# Patient Record
Sex: FEMALE | Race: WHITE | Employment: FULL TIME | URBAN - METROPOLITAN AREA
[De-identification: names, ages, dates, MRNs, and addresses within clinical notes are randomized per-mention and may not be internally consistent; named-entity substitution may affect disease eponyms.]

---

## 2017-09-17 PROBLEM — S82.401A FRACTURE OF RIGHT FIBULA: Status: ACTIVE | Noted: 2017-09-17

## 2017-09-17 PROBLEM — S82.201A FRACTURE OF RIGHT TIBIA: Status: ACTIVE | Noted: 2017-09-17

## 2019-03-15 ENCOUNTER — OFFICE VISIT (OUTPATIENT)
Dept: ORTHOPEDIC SURGERY | Age: 49
End: 2019-03-15
Payer: COMMERCIAL

## 2019-03-15 VITALS — WEIGHT: 124.9 LBS | HEIGHT: 64 IN | TEMPERATURE: 98 F | BODY MASS INDEX: 21.32 KG/M2

## 2019-03-15 DIAGNOSIS — M16.12 PRIMARY OSTEOARTHRITIS OF LEFT HIP: Primary | ICD-10-CM

## 2019-03-15 PROCEDURE — 99213 OFFICE O/P EST LOW 20 MIN: CPT | Performed by: ORTHOPAEDIC SURGERY

## 2019-04-26 RX ORDER — SODIUM CHLORIDE, SODIUM LACTATE, POTASSIUM CHLORIDE, CALCIUM CHLORIDE 600; 310; 30; 20 MG/100ML; MG/100ML; MG/100ML; MG/100ML
INJECTION, SOLUTION INTRAVENOUS CONTINUOUS
Status: CANCELLED | OUTPATIENT
Start: 2019-05-07

## 2019-04-29 ENCOUNTER — HOSPITAL ENCOUNTER (OUTPATIENT)
Dept: PREADMISSION TESTING | Age: 49
Discharge: HOME | End: 2019-04-29
Payer: COMMERCIAL

## 2019-04-29 ENCOUNTER — ANESTHESIA EVENT (OUTPATIENT)
Dept: OPERATING ROOM | Age: 49
DRG: 470 | End: 2019-04-29
Payer: COMMERCIAL

## 2019-04-29 VITALS
TEMPERATURE: 97.9 F | RESPIRATION RATE: 16 BRPM | OXYGEN SATURATION: 100 % | WEIGHT: 127 LBS | HEART RATE: 75 BPM | BODY MASS INDEX: 21.68 KG/M2 | SYSTOLIC BLOOD PRESSURE: 130 MMHG | HEIGHT: 64 IN | DIASTOLIC BLOOD PRESSURE: 76 MMHG

## 2019-04-29 DIAGNOSIS — M16.12 ARTHRITIS OF LEFT HIP: Primary | ICD-10-CM

## 2019-04-29 LAB
ABO/RH: NORMAL
ANION GAP SERPL CALCULATED.3IONS-SCNC: 10 MMOL/L (ref 7–16)
ANTIBODY SCREEN: NORMAL
APTT: 31.7 SEC (ref 24.5–35.1)
BUN BLDV-MCNC: 10 MG/DL (ref 6–20)
CALCIUM SERPL-MCNC: 9.5 MG/DL (ref 8.6–10.2)
CHLORIDE BLD-SCNC: 100 MMOL/L (ref 98–107)
CO2: 28 MMOL/L (ref 22–29)
CREAT SERPL-MCNC: 0.7 MG/DL (ref 0.5–1)
GFR AFRICAN AMERICAN: >60
GFR NON-AFRICAN AMERICAN: >60 ML/MIN/1.73
GLUCOSE BLD-MCNC: 96 MG/DL (ref 74–99)
HCT VFR BLD CALC: 42.5 % (ref 34–48)
HEMOGLOBIN: 14 G/DL (ref 11.5–15.5)
INR BLD: 1
MCH RBC QN AUTO: 31.7 PG (ref 26–35)
MCHC RBC AUTO-ENTMCNC: 32.9 % (ref 32–34.5)
MCV RBC AUTO: 96.2 FL (ref 80–99.9)
PDW BLD-RTO: 12.9 FL (ref 11.5–15)
PLATELET # BLD: 294 E9/L (ref 130–450)
PMV BLD AUTO: 10.5 FL (ref 7–12)
POTASSIUM REFLEX MAGNESIUM: 4 MMOL/L (ref 3.5–5)
PROTHROMBIN TIME: 11.4 SEC (ref 9.3–12.4)
RBC # BLD: 4.42 E12/L (ref 3.5–5.5)
SODIUM BLD-SCNC: 138 MMOL/L (ref 132–146)
WBC # BLD: 5.8 E9/L (ref 4.5–11.5)

## 2019-04-29 PROCEDURE — 85027 COMPLETE CBC AUTOMATED: CPT

## 2019-04-29 PROCEDURE — 85730 THROMBOPLASTIN TIME PARTIAL: CPT

## 2019-04-29 PROCEDURE — 86850 RBC ANTIBODY SCREEN: CPT

## 2019-04-29 PROCEDURE — 86900 BLOOD TYPING SEROLOGIC ABO: CPT

## 2019-04-29 PROCEDURE — 85610 PROTHROMBIN TIME: CPT

## 2019-04-29 PROCEDURE — 86901 BLOOD TYPING SEROLOGIC RH(D): CPT

## 2019-04-29 PROCEDURE — 36415 COLL VENOUS BLD VENIPUNCTURE: CPT

## 2019-04-29 PROCEDURE — 87081 CULTURE SCREEN ONLY: CPT

## 2019-04-29 PROCEDURE — 80048 BASIC METABOLIC PNL TOTAL CA: CPT

## 2019-04-29 RX ORDER — HYDROCORTISONE ACETATE 25 MG/1
SUPPOSITORY RECTAL
Refills: 1 | COMMUNITY
Start: 2019-02-08 | End: 2019-05-31

## 2019-04-29 ASSESSMENT — PAIN DESCRIPTION - ORIENTATION: ORIENTATION: LEFT

## 2019-04-29 ASSESSMENT — PAIN DESCRIPTION - LOCATION: LOCATION: HIP

## 2019-04-29 ASSESSMENT — PAIN - FUNCTIONAL ASSESSMENT: PAIN_FUNCTIONAL_ASSESSMENT: PREVENTS OR INTERFERES SOME ACTIVE ACTIVITIES AND ADLS

## 2019-04-29 ASSESSMENT — PAIN DESCRIPTION - ONSET: ONSET: ON-GOING

## 2019-04-29 ASSESSMENT — PAIN DESCRIPTION - FREQUENCY: FREQUENCY: CONTINUOUS

## 2019-04-29 ASSESSMENT — PAIN DESCRIPTION - PROGRESSION: CLINICAL_PROGRESSION: GRADUALLY WORSENING

## 2019-04-29 ASSESSMENT — PAIN DESCRIPTION - DESCRIPTORS: DESCRIPTORS: ACHING;SHARP

## 2019-04-29 ASSESSMENT — PAIN DESCRIPTION - PAIN TYPE: TYPE: CHRONIC PAIN

## 2019-04-29 ASSESSMENT — PAIN SCALES - GENERAL: PAINLEVEL_OUTOF10: 3

## 2019-04-29 NOTE — PROGRESS NOTES
Patient and spouse attended preoperative Total [address] on 4/29/2019. Patient is scheduled to have an elective hip replacement. Patient was educated regarding Disease Process, Medications, Smoking Cessation, Oxygenation, Incentive Spirometry and Deep Breath and Cough, signs and symptoms of postoperative joint infection that include: Fever, Chills, Pain Control, Drainage and Redness, post-op follow up with orthopaedic surgeon, dressing removal, staple removal, ambulatory devices which include a standard walker and cane, bed mobility, correct anatomical alignment, active range of motion, proper transferring technique, incision care, infection prevention measures, non-pharmacologic comfort measures, notification of inadequate pain control measures, pain scale for assessing level of pain, pharmacologic pain management, relaxation techniques. [id number] also included patient and family watching an educational total hip replacement video and visual examples of mobility training postoperative by the orthopaedic navigator.

## 2019-04-29 NOTE — PROGRESS NOTES
[address] [facility]                                                                                                                    PRE OP INSTRUCTIONS FOR  Winnie Brunner        Date: 4/29/2019    Date of surgery: 5/7/19   Arrival Time: Hospital will call you between 5pm and 7pm with your final arrival time for surgery    1. Do not eat or drink anything after midnight prior to surgery. This includes no water, chewing gum, mints or ice chips. 2. Take the following medications with a small sip of water on the morning of Surgery: none     3. Diabetics may take evening dose of insulin but none after midnight. If you feel symptomatic or low blood sugar morning of surgery drink 1-2 ounces of apple juice only. 4. Aspirin, Ibuprofen, Advil, Naproxen, Vitamin E and other Anti-inflammatory products should be stopped  before surgery  as directed by your physician. Take Tylenol only unless instructed otherwise by your surgeon. 5. Check with your Doctor regarding stopping Plavix, Coumadin, Lovenox, Eliquis, Effient, or other blood thinners. 6. Do not smoke,use illicit drugs and do not drink any alcoholic beverages 24 hours prior to surgery. 7. You may brush your teeth the morning of surgery. DO NOT SWALLOW WATER    8. You MUST make arrangements for a responsible adult to take you home after your surgery. You will not be allowed to leave alone or drive yourself home. It is strongly suggested someone stay with you the first 24 hrs. Your surgery will be cancelled if you do not have a ride home. 9. PEDIATRIC PATIENTS ONLY:  A parent/legal guardian must accompany a child scheduled for surgery and plan to stay at the hospital until the child is discharged. Please do not bring other children with you.     10. Please wear simple, loose fitting clothing to the hospital.  Ana Nataliya not bring valuables (money, credit cards, checkbooks, etc.) Do not wear any makeup (including no eye makeup) or nail

## 2019-04-30 LAB — MRSA CULTURE ONLY: NORMAL

## 2019-04-30 NOTE — FLOWSHEET NOTE
04/30/19 1443   Social/Functional History   Lives With Spouse   Type of [address] Two level; Able to Live on Main level with bedroom/bathroom   Home Access Stairs to enter without rails   Entrance Stairs - Number of Steps 2 steps into home    Receives Help From Family   Met with pt in PAT, pt having surgery for a total hip arthroplasty on 5/7 , explained sw role for transition of care and reviewed rehab options and providers for [facility] [place] insurance, pt relays no past HHC/DME needs,list offered, pt plans to return home with assistance from her  and receptive to [facility] with [facility], pt anticipates borrowing dme needed from family. Notified Moira 11 liaison for [facility] who will follow post-op for home care orders and anticipated medical discharge on 5/8. Pt will use hospital pharmacy.  Electronically signed by GALLITO Pollack on 4/30/2019 at 2:36 PM

## 2019-05-07 ENCOUNTER — HOSPITAL ENCOUNTER (INPATIENT)
Age: 49
LOS: 1 days | Discharge: HOME HEALTH | DRG: 470 | End: 2019-05-08
Attending: ORTHOPAEDIC SURGERY | Admitting: ORTHOPAEDIC SURGERY
Payer: COMMERCIAL

## 2019-05-07 ENCOUNTER — APPOINTMENT (OUTPATIENT)
Dept: GENERAL RADIOLOGY | Age: 49
DRG: 470 | End: 2019-05-07
Attending: ORTHOPAEDIC SURGERY
Payer: COMMERCIAL

## 2019-05-07 ENCOUNTER — ANESTHESIA (OUTPATIENT)
Dept: OPERATING ROOM | Age: 49
DRG: 470 | End: 2019-05-07
Payer: COMMERCIAL

## 2019-05-07 VITALS
RESPIRATION RATE: 16 BRPM | SYSTOLIC BLOOD PRESSURE: 102 MMHG | OXYGEN SATURATION: 89 % | DIASTOLIC BLOOD PRESSURE: 51 MMHG | TEMPERATURE: 93.2 F

## 2019-05-07 DIAGNOSIS — M16.12 ARTHRITIS OF LEFT HIP: Primary | ICD-10-CM

## 2019-05-07 DIAGNOSIS — M16.0 PRIMARY OSTEOARTHRITIS OF BOTH HIPS: ICD-10-CM

## 2019-05-07 PROBLEM — M16.9 DEGENERATIVE ARTHRITIS OF HIP: Status: ACTIVE | Noted: 2019-05-07

## 2019-05-07 LAB — HCG(URINE) PREGNANCY TEST: NEGATIVE

## 2019-05-07 PROCEDURE — 2500000003 HC RX 250 WO HCPCS: Performed by: ORTHOPAEDIC SURGERY

## 2019-05-07 PROCEDURE — 7100000000 HC PACU RECOVERY - FIRST 15 MIN: Performed by: ORTHOPAEDIC SURGERY

## 2019-05-07 PROCEDURE — 6370000000 HC RX 637 (ALT 250 FOR IP): Performed by: STUDENT IN AN ORGANIZED HEALTH CARE EDUCATION/TRAINING PROGRAM

## 2019-05-07 PROCEDURE — 6360000002 HC RX W HCPCS: Performed by: NURSE ANESTHETIST, CERTIFIED REGISTERED

## 2019-05-07 PROCEDURE — 3209999900 FLUORO FOR SURGICAL PROCEDURES

## 2019-05-07 PROCEDURE — 6360000002 HC RX W HCPCS: Performed by: ORTHOPAEDIC SURGERY

## 2019-05-07 PROCEDURE — 97165 OT EVAL LOW COMPLEX 30 MIN: CPT

## 2019-05-07 PROCEDURE — 0SRB02Z REPLACEMENT OF LEFT HIP JOINT WITH METAL ON POLYETHYLENE SYNTHETIC SUBSTITUTE, OPEN APPROACH: ICD-10-PCS | Performed by: ORTHOPAEDIC SURGERY

## 2019-05-07 PROCEDURE — 88304 TISSUE EXAM BY PATHOLOGIST: CPT

## 2019-05-07 PROCEDURE — 3700000001 HC ADD 15 MINUTES (ANESTHESIA): Performed by: ORTHOPAEDIC SURGERY

## 2019-05-07 PROCEDURE — C1776 JOINT DEVICE (IMPLANTABLE): HCPCS | Performed by: ORTHOPAEDIC SURGERY

## 2019-05-07 PROCEDURE — 81025 URINE PREGNANCY TEST: CPT

## 2019-05-07 PROCEDURE — 88311 DECALCIFY TISSUE: CPT

## 2019-05-07 PROCEDURE — 2580000003 HC RX 258: Performed by: NURSE ANESTHETIST, CERTIFIED REGISTERED

## 2019-05-07 PROCEDURE — 6370000000 HC RX 637 (ALT 250 FOR IP): Performed by: ANESTHESIOLOGY

## 2019-05-07 PROCEDURE — 1200000000 HC SEMI PRIVATE

## 2019-05-07 PROCEDURE — 73502 X-RAY EXAM HIP UNI 2-3 VIEWS: CPT

## 2019-05-07 PROCEDURE — 2709999900 HC NON-CHARGEABLE SUPPLY: Performed by: ORTHOPAEDIC SURGERY

## 2019-05-07 PROCEDURE — 2580000003 HC RX 258: Performed by: ANESTHESIOLOGY

## 2019-05-07 PROCEDURE — 2500000003 HC RX 250 WO HCPCS: Performed by: NURSE ANESTHETIST, CERTIFIED REGISTERED

## 2019-05-07 PROCEDURE — 6370000000 HC RX 637 (ALT 250 FOR IP): Performed by: ORTHOPAEDIC SURGERY

## 2019-05-07 PROCEDURE — 3700000000 HC ANESTHESIA ATTENDED CARE: Performed by: ORTHOPAEDIC SURGERY

## 2019-05-07 PROCEDURE — 3600000005 HC SURGERY LEVEL 5 BASE: Performed by: ORTHOPAEDIC SURGERY

## 2019-05-07 PROCEDURE — 97161 PT EVAL LOW COMPLEX 20 MIN: CPT | Performed by: PHYSICAL THERAPIST

## 2019-05-07 PROCEDURE — 3600000015 HC SURGERY LEVEL 5 ADDTL 15MIN: Performed by: ORTHOPAEDIC SURGERY

## 2019-05-07 PROCEDURE — 2580000003 HC RX 258: Performed by: ORTHOPAEDIC SURGERY

## 2019-05-07 PROCEDURE — 7100000001 HC PACU RECOVERY - ADDTL 15 MIN: Performed by: ORTHOPAEDIC SURGERY

## 2019-05-07 PROCEDURE — 97530 THERAPEUTIC ACTIVITIES: CPT

## 2019-05-07 PROCEDURE — 97530 THERAPEUTIC ACTIVITIES: CPT | Performed by: PHYSICAL THERAPIST

## 2019-05-07 DEVICE — LINER ACET OD50MM ID32MM NEUT OFFSET HIP ALTRX PINN: Type: IMPLANTABLE DEVICE | Status: FUNCTIONAL

## 2019-05-07 DEVICE — HEAD FEM DIA32MM +5MM OFFSET 12/14 TAPR HIP CERAMIC BIOLOX: Type: IMPLANTABLE DEVICE | Status: FUNCTIONAL

## 2019-05-07 DEVICE — COMPONENT TOT HIP CAPPED ADV CEM POROUS: Type: IMPLANTABLE DEVICE | Status: FUNCTIONAL

## 2019-05-07 DEVICE — ELIMINATOR H APEX FOR 48-60MM PINN HIP SHELL: Type: IMPLANTABLE DEVICE | Status: FUNCTIONAL

## 2019-05-07 DEVICE — IMPLANTABLE DEVICE: Type: IMPLANTABLE DEVICE | Status: FUNCTIONAL

## 2019-05-07 DEVICE — STEM FEM SZ 12 L130MM NK L38.5MM 135DEG 41MM OFFSET STD HIP: Type: IMPLANTABLE DEVICE | Status: FUNCTIONAL

## 2019-05-07 RX ORDER — CEFAZOLIN SODIUM 2 G/50ML
SOLUTION INTRAVENOUS
Status: DISPENSED
Start: 2019-05-07 | End: 2019-05-07

## 2019-05-07 RX ORDER — HYDROCODONE BITARTRATE AND ACETAMINOPHEN 5; 325 MG/1; MG/1
2 TABLET ORAL EVERY 4 HOURS PRN
Status: DISCONTINUED | OUTPATIENT
Start: 2019-05-07 | End: 2019-05-08 | Stop reason: HOSPADM

## 2019-05-07 RX ORDER — GLYCOPYRROLATE 1 MG/5 ML
SYRINGE (ML) INTRAVENOUS PRN
Status: DISCONTINUED | OUTPATIENT
Start: 2019-05-07 | End: 2019-05-07 | Stop reason: SDUPTHER

## 2019-05-07 RX ORDER — PROPOFOL 10 MG/ML
INJECTION, EMULSION INTRAVENOUS PRN
Status: DISCONTINUED | OUTPATIENT
Start: 2019-05-07 | End: 2019-05-07 | Stop reason: SDUPTHER

## 2019-05-07 RX ORDER — PROPOFOL 10 MG/ML
INJECTION, EMULSION INTRAVENOUS CONTINUOUS PRN
Status: DISCONTINUED | OUTPATIENT
Start: 2019-05-07 | End: 2019-05-07 | Stop reason: SDUPTHER

## 2019-05-07 RX ORDER — HYDROCODONE BITARTRATE AND ACETAMINOPHEN 5; 325 MG/1; MG/1
1 TABLET ORAL EVERY 4 HOURS PRN
Status: DISCONTINUED | OUTPATIENT
Start: 2019-05-07 | End: 2019-05-08 | Stop reason: HOSPADM

## 2019-05-07 RX ORDER — DEXTROSE AND SODIUM CHLORIDE 5; .45 G/100ML; G/100ML
INJECTION, SOLUTION INTRAVENOUS CONTINUOUS
Status: DISCONTINUED | OUTPATIENT
Start: 2019-05-07 | End: 2019-05-08

## 2019-05-07 RX ORDER — SODIUM CHLORIDE 0.9 % (FLUSH) 0.9 %
10 SYRINGE (ML) INJECTION PRN
Status: DISCONTINUED | OUTPATIENT
Start: 2019-05-07 | End: 2019-05-07 | Stop reason: HOSPADM

## 2019-05-07 RX ORDER — ONDANSETRON 2 MG/ML
4 INJECTION INTRAMUSCULAR; INTRAVENOUS EVERY 6 HOURS PRN
Status: DISCONTINUED | OUTPATIENT
Start: 2019-05-07 | End: 2019-05-08 | Stop reason: HOSPADM

## 2019-05-07 RX ORDER — IBUPROFEN 400 MG/1
400 TABLET ORAL EVERY 8 HOURS PRN
Status: DISCONTINUED | OUTPATIENT
Start: 2019-05-07 | End: 2019-05-08 | Stop reason: HOSPADM

## 2019-05-07 RX ORDER — CELECOXIB 100 MG/1
200 CAPSULE ORAL ONCE
Status: DISCONTINUED | OUTPATIENT
Start: 2019-05-07 | End: 2019-05-07

## 2019-05-07 RX ORDER — ACETAMINOPHEN 500 MG
1000 TABLET ORAL ONCE
Status: COMPLETED | OUTPATIENT
Start: 2019-05-07 | End: 2019-05-07

## 2019-05-07 RX ORDER — SODIUM CHLORIDE, SODIUM LACTATE, POTASSIUM CHLORIDE, CALCIUM CHLORIDE 600; 310; 30; 20 MG/100ML; MG/100ML; MG/100ML; MG/100ML
INJECTION, SOLUTION INTRAVENOUS CONTINUOUS
Status: DISCONTINUED | OUTPATIENT
Start: 2019-05-07 | End: 2019-05-07

## 2019-05-07 RX ORDER — GABAPENTIN 300 MG/1
600 CAPSULE ORAL ONCE
Status: COMPLETED | OUTPATIENT
Start: 2019-05-07 | End: 2019-05-07

## 2019-05-07 RX ORDER — MORPHINE SULFATE 4 MG/ML
4 INJECTION, SOLUTION INTRAMUSCULAR; INTRAVENOUS
Status: DISCONTINUED | OUTPATIENT
Start: 2019-05-07 | End: 2019-05-08 | Stop reason: HOSPADM

## 2019-05-07 RX ORDER — BUPIVACAINE HYDROCHLORIDE 7.5 MG/ML
INJECTION, SOLUTION INTRASPINAL PRN
Status: DISCONTINUED | OUTPATIENT
Start: 2019-05-07 | End: 2019-05-07 | Stop reason: SDUPTHER

## 2019-05-07 RX ORDER — MIDAZOLAM HYDROCHLORIDE 1 MG/ML
INJECTION INTRAMUSCULAR; INTRAVENOUS PRN
Status: DISCONTINUED | OUTPATIENT
Start: 2019-05-07 | End: 2019-05-07 | Stop reason: SDUPTHER

## 2019-05-07 RX ORDER — DOCUSATE SODIUM 100 MG/1
100 CAPSULE, LIQUID FILLED ORAL 2 TIMES DAILY
Status: DISCONTINUED | OUTPATIENT
Start: 2019-05-07 | End: 2019-05-08 | Stop reason: HOSPADM

## 2019-05-07 RX ORDER — FENTANYL CITRATE 50 UG/ML
INJECTION, SOLUTION INTRAMUSCULAR; INTRAVENOUS PRN
Status: DISCONTINUED | OUTPATIENT
Start: 2019-05-07 | End: 2019-05-07 | Stop reason: SDUPTHER

## 2019-05-07 RX ORDER — SODIUM CHLORIDE 0.9 % (FLUSH) 0.9 %
10 SYRINGE (ML) INJECTION EVERY 12 HOURS SCHEDULED
Status: DISCONTINUED | OUTPATIENT
Start: 2019-05-07 | End: 2019-05-07 | Stop reason: HOSPADM

## 2019-05-07 RX ORDER — OXYCODONE HCL 10 MG/1
10 TABLET, FILM COATED, EXTENDED RELEASE ORAL ONCE
Status: COMPLETED | OUTPATIENT
Start: 2019-05-07 | End: 2019-05-07

## 2019-05-07 RX ORDER — SODIUM CHLORIDE 0.9 % (FLUSH) 0.9 %
10 SYRINGE (ML) INJECTION EVERY 12 HOURS SCHEDULED
Status: DISCONTINUED | OUTPATIENT
Start: 2019-05-07 | End: 2019-05-08 | Stop reason: HOSPADM

## 2019-05-07 RX ORDER — CEFAZOLIN SODIUM 2 G/50ML
2 SOLUTION INTRAVENOUS
Status: COMPLETED | OUTPATIENT
Start: 2019-05-07 | End: 2019-05-07

## 2019-05-07 RX ORDER — HYDROMORPHONE HYDROCHLORIDE 1 MG/ML
0.5 INJECTION, SOLUTION INTRAMUSCULAR; INTRAVENOUS; SUBCUTANEOUS EVERY 5 MIN PRN
Status: DISCONTINUED | OUTPATIENT
Start: 2019-05-07 | End: 2019-05-07 | Stop reason: HOSPADM

## 2019-05-07 RX ORDER — SODIUM CHLORIDE 0.9 % (FLUSH) 0.9 %
10 SYRINGE (ML) INJECTION PRN
Status: DISCONTINUED | OUTPATIENT
Start: 2019-05-07 | End: 2019-05-08 | Stop reason: HOSPADM

## 2019-05-07 RX ORDER — CEFAZOLIN SODIUM 2 G/50ML
2 SOLUTION INTRAVENOUS EVERY 8 HOURS
Status: COMPLETED | OUTPATIENT
Start: 2019-05-07 | End: 2019-05-07

## 2019-05-07 RX ORDER — ACETAMINOPHEN 325 MG/1
650 TABLET ORAL EVERY 4 HOURS PRN
Status: DISCONTINUED | OUTPATIENT
Start: 2019-05-07 | End: 2019-05-08 | Stop reason: HOSPADM

## 2019-05-07 RX ORDER — MEPERIDINE HYDROCHLORIDE 50 MG/ML
12.5 INJECTION INTRAMUSCULAR; INTRAVENOUS; SUBCUTANEOUS EVERY 5 MIN PRN
Status: DISCONTINUED | OUTPATIENT
Start: 2019-05-07 | End: 2019-05-07 | Stop reason: HOSPADM

## 2019-05-07 RX ORDER — MORPHINE SULFATE 2 MG/ML
2 INJECTION, SOLUTION INTRAMUSCULAR; INTRAVENOUS
Status: DISCONTINUED | OUTPATIENT
Start: 2019-05-07 | End: 2019-05-08 | Stop reason: HOSPADM

## 2019-05-07 RX ORDER — SODIUM CHLORIDE, SODIUM LACTATE, POTASSIUM CHLORIDE, CALCIUM CHLORIDE 600; 310; 30; 20 MG/100ML; MG/100ML; MG/100ML; MG/100ML
INJECTION, SOLUTION INTRAVENOUS CONTINUOUS PRN
Status: DISCONTINUED | OUTPATIENT
Start: 2019-05-07 | End: 2019-05-07 | Stop reason: SDUPTHER

## 2019-05-07 RX ORDER — EPHEDRINE SULFATE/0.9% NACL/PF 50 MG/5 ML
SYRINGE (ML) INTRAVENOUS PRN
Status: DISCONTINUED | OUTPATIENT
Start: 2019-05-07 | End: 2019-05-07 | Stop reason: SDUPTHER

## 2019-05-07 RX ADMIN — ACETAMINOPHEN 650 MG: 325 TABLET, FILM COATED ORAL at 21:12

## 2019-05-07 RX ADMIN — CEFAZOLIN SODIUM 2 G: 2 SOLUTION INTRAVENOUS at 11:44

## 2019-05-07 RX ADMIN — CEFAZOLIN SODIUM 2 G: 2 SOLUTION INTRAVENOUS at 08:07

## 2019-05-07 RX ADMIN — FENTANYL CITRATE 25 MCG: 50 INJECTION, SOLUTION INTRAMUSCULAR; INTRAVENOUS at 08:55

## 2019-05-07 RX ADMIN — SODIUM CHLORIDE, POTASSIUM CHLORIDE, SODIUM LACTATE AND CALCIUM CHLORIDE: 600; 310; 30; 20 INJECTION, SOLUTION INTRAVENOUS at 06:09

## 2019-05-07 RX ADMIN — GABAPENTIN 600 MG: 300 CAPSULE ORAL at 06:12

## 2019-05-07 RX ADMIN — FENTANYL CITRATE 50 MCG: 50 INJECTION, SOLUTION INTRAMUSCULAR; INTRAVENOUS at 10:51

## 2019-05-07 RX ADMIN — PHENYLEPHRINE HYDROCHLORIDE 100 MCG: 10 INJECTION INTRAVENOUS at 08:25

## 2019-05-07 RX ADMIN — PHENYLEPHRINE HYDROCHLORIDE 100 MCG: 10 INJECTION INTRAVENOUS at 10:04

## 2019-05-07 RX ADMIN — PHENYLEPHRINE HYDROCHLORIDE 100 MCG: 10 INJECTION INTRAVENOUS at 08:52

## 2019-05-07 RX ADMIN — BUPIVACAINE HYDROCHLORIDE IN DEXTROSE 2 ML: 7.5 INJECTION, SOLUTION SUBARACHNOID at 07:54

## 2019-05-07 RX ADMIN — SODIUM CHLORIDE, POTASSIUM CHLORIDE, SODIUM LACTATE AND CALCIUM CHLORIDE: 600; 310; 30; 20 INJECTION, SOLUTION INTRAVENOUS at 08:15

## 2019-05-07 RX ADMIN — PROPOFOL 35 MCG/KG/MIN: 10 INJECTION, EMULSION INTRAVENOUS at 08:07

## 2019-05-07 RX ADMIN — ONDANSETRON 4 MG: 2 INJECTION INTRAMUSCULAR; INTRAVENOUS at 13:28

## 2019-05-07 RX ADMIN — PHENYLEPHRINE HYDROCHLORIDE 100 MCG: 10 INJECTION INTRAVENOUS at 08:31

## 2019-05-07 RX ADMIN — MIDAZOLAM 2 MG: 1 INJECTION INTRAMUSCULAR; INTRAVENOUS at 07:43

## 2019-05-07 RX ADMIN — Medication 10 MG: at 09:27

## 2019-05-07 RX ADMIN — DOCUSATE SODIUM 100 MG: 100 CAPSULE, LIQUID FILLED ORAL at 21:12

## 2019-05-07 RX ADMIN — MIDAZOLAM 2 MG: 1 INJECTION INTRAMUSCULAR; INTRAVENOUS at 08:57

## 2019-05-07 RX ADMIN — FENTANYL CITRATE 25 MCG: 50 INJECTION, SOLUTION INTRAMUSCULAR; INTRAVENOUS at 07:54

## 2019-05-07 RX ADMIN — PHENYLEPHRINE HYDROCHLORIDE 100 MCG: 10 INJECTION INTRAVENOUS at 09:06

## 2019-05-07 RX ADMIN — ACETAMINOPHEN 1000 MG: 500 TABLET, FILM COATED ORAL at 06:12

## 2019-05-07 RX ADMIN — OXYCODONE HYDROCHLORIDE 10 MG: 10 TABLET, FILM COATED, EXTENDED RELEASE ORAL at 06:14

## 2019-05-07 RX ADMIN — ACETAMINOPHEN 650 MG: 325 TABLET, FILM COATED ORAL at 14:55

## 2019-05-07 RX ADMIN — SODIUM CHLORIDE, POTASSIUM CHLORIDE, SODIUM LACTATE AND CALCIUM CHLORIDE: 600; 310; 30; 20 INJECTION, SOLUTION INTRAVENOUS at 10:03

## 2019-05-07 RX ADMIN — PHENYLEPHRINE HYDROCHLORIDE 200 MCG: 10 INJECTION INTRAVENOUS at 08:13

## 2019-05-07 RX ADMIN — PROPOFOL 50 MG: 10 INJECTION, EMULSION INTRAVENOUS at 07:56

## 2019-05-07 RX ADMIN — TRANEXAMIC ACID 1 G: 1 INJECTION, SOLUTION INTRAVENOUS at 08:15

## 2019-05-07 RX ADMIN — FENTANYL CITRATE 50 MCG: 50 INJECTION, SOLUTION INTRAMUSCULAR; INTRAVENOUS at 09:44

## 2019-05-07 RX ADMIN — DEXTROSE AND SODIUM CHLORIDE: 5; 450 INJECTION, SOLUTION INTRAVENOUS at 19:22

## 2019-05-07 RX ADMIN — Medication 0.2 MG: at 08:29

## 2019-05-07 RX ADMIN — PHENYLEPHRINE HYDROCHLORIDE 100 MCG: 10 INJECTION INTRAVENOUS at 09:16

## 2019-05-07 RX ADMIN — PROPOFOL 30 MG: 10 INJECTION, EMULSION INTRAVENOUS at 08:41

## 2019-05-07 RX ADMIN — SODIUM CHLORIDE, POTASSIUM CHLORIDE, SODIUM LACTATE AND CALCIUM CHLORIDE: 600; 310; 30; 20 INJECTION, SOLUTION INTRAVENOUS at 07:47

## 2019-05-07 RX ADMIN — Medication 20 MG: at 08:38

## 2019-05-07 RX ADMIN — FENTANYL CITRATE 50 MCG: 50 INJECTION, SOLUTION INTRAMUSCULAR; INTRAVENOUS at 07:50

## 2019-05-07 RX ADMIN — IBUPROFEN 400 MG: 400 TABLET ORAL at 19:21

## 2019-05-07 RX ADMIN — CEFAZOLIN SODIUM 2 G: 2 SOLUTION INTRAVENOUS at 19:24

## 2019-05-07 ASSESSMENT — PULMONARY FUNCTION TESTS
PIF_VALUE: 1
PIF_VALUE: 0
PIF_VALUE: 1
PIF_VALUE: 0
PIF_VALUE: 1
PIF_VALUE: 0
PIF_VALUE: 1
PIF_VALUE: 0
PIF_VALUE: 1
PIF_VALUE: 0
PIF_VALUE: 1
PIF_VALUE: 0
PIF_VALUE: 1
PIF_VALUE: 0
PIF_VALUE: 1
PIF_VALUE: 0
PIF_VALUE: 1
PIF_VALUE: 0
PIF_VALUE: 1
PIF_VALUE: 0
PIF_VALUE: 1
PIF_VALUE: 0
PIF_VALUE: 1
PIF_VALUE: 0
PIF_VALUE: 1
PIF_VALUE: 0
PIF_VALUE: 1
PIF_VALUE: 0
PIF_VALUE: 0
PIF_VALUE: 1
PIF_VALUE: 0
PIF_VALUE: 1
PIF_VALUE: 0
PIF_VALUE: 1

## 2019-05-07 ASSESSMENT — PAIN - FUNCTIONAL ASSESSMENT
PAIN_FUNCTIONAL_ASSESSMENT: 0-10
PAIN_FUNCTIONAL_ASSESSMENT: ACTIVITIES ARE NOT PREVENTED

## 2019-05-07 ASSESSMENT — PAIN DESCRIPTION - DESCRIPTORS
DESCRIPTORS: ACHING;DISCOMFORT;SORE
DESCRIPTORS: DISCOMFORT;DULL;ACHING

## 2019-05-07 ASSESSMENT — PAIN DESCRIPTION - ORIENTATION
ORIENTATION: LEFT;ANTERIOR
ORIENTATION: LEFT

## 2019-05-07 ASSESSMENT — PAIN DESCRIPTION - FREQUENCY: FREQUENCY: INTERMITTENT

## 2019-05-07 ASSESSMENT — PAIN SCALES - GENERAL
PAINLEVEL_OUTOF10: 2
PAINLEVEL_OUTOF10: 5
PAINLEVEL_OUTOF10: 8
PAINLEVEL_OUTOF10: 0
PAINLEVEL_OUTOF10: 0
PAINLEVEL_OUTOF10: 8
PAINLEVEL_OUTOF10: 2
PAINLEVEL_OUTOF10: 4
PAINLEVEL_OUTOF10: 4
PAINLEVEL_OUTOF10: 3

## 2019-05-07 ASSESSMENT — PAIN DESCRIPTION - PAIN TYPE
TYPE: SURGICAL PAIN

## 2019-05-07 ASSESSMENT — PAIN DESCRIPTION - LOCATION
LOCATION: HIP

## 2019-05-07 NOTE — ANESTHESIA PRE PROCEDURE
Department of Anesthesiology  Preprocedure Note       Name:  Nia Calix   Age:  50 y.o.  :  1970                                          MRN:  [id number]         Date:  2019      Surgeon: Nakul Tariq):  FAYE Nayak DO    Procedure: LEFT HIP TOTAL ARTHROPLASTY WITH CELL SAVER (DEPUY) (Left )    Medications prior to admission:   Prior to Admission medications    Medication Sig Start Date End Date Taking? Authorizing Provider   hydrocortisone (ANUSOL-HC) 25 MG suppository UNWRAP AND INSERT ONE SUPPOSITORY RECTALLY EVERY 12 HOURS 19  Yes Historical Provider, MD   Omega-3 Fatty Acids (FISH OIL PO) Take by mouth daily    Historical Provider, MD   ibuprofen (IBU) 600 MG tablet Take 1 tablet by mouth every 6 hours as needed for Pain for 90 doses. Patient taking differently: Take 400 mg by mouth as needed for Pain  11/15/12   Kiley Chowdary DO       Current medications:    Current Facility-Administered Medications   Medication Dose Route Frequency Provider Last Rate Last Dose    sodium chloride flush 0.9 % injection 10 mL  10 mL Intravenous 2 times per day FAYE Nayak DO        sodium chloride flush 0.9 % injection 10 mL  10 mL Intravenous PRN FAYE Nayak,         ceFAZolin (ANCEF) 2 g in dextrose 3 % 50 mL IVPB (duplex)  2 g Intravenous On Call to [address], DO        tranexamic acid (CYKLOKAPRON) 1,000 mg in dextrose 5 % 100 mL IVPB  1,000 mg Intravenous On Call to [address], DO        lactated ringers infusion   Intravenous Continuous Beth Luna MD 10 mL/hr at 19 0609         Allergies:     Allergies   Allergen Reactions    Naproxen Other (See Comments)     Rectal bleeding, fever    Sulfa Antibiotics Hives and Itching    Codeine Nausea And Vomiting    Vicodin [Hydrocodone-Acetaminophen] Nausea And Vomiting       Problem List:    Patient Active Problem List   Diagnosis Code    Shoulder pain M25.519    Shoulder contusion S40.019A    Value Date    WBC 5.8 04/29/2019    RBC 4.42 04/29/2019    HGB 14.0 04/29/2019    HCT 42.5 04/29/2019    MCV 96.2 04/29/2019    RDW 12.9 04/29/2019     04/29/2019       CMP:   Lab Results   Component Value Date     04/29/2019    K 4.0 04/29/2019     04/29/2019    CO2 28 04/29/2019    BUN 10 04/29/2019    CREATININE 0.7 04/29/2019    GFRAA >60 04/29/2019    LABGLOM >60 04/29/2019    GLUCOSE 96 04/29/2019    PROT 6.3 09/17/2017    CALCIUM 9.5 04/29/2019    BILITOT 0.3 09/17/2017    ALKPHOS 62 09/17/2017    AST 21 09/17/2017    ALT 12 09/17/2017       POC Tests: No results for input(s): POCGLU, POCNA, POCK, POCCL, POCBUN, POCHEMO, POCHCT in the last 72 hours. Coags:   Lab Results   Component Value Date    PROTIME 11.4 04/29/2019    INR 1.0 04/29/2019    APTT 31.7 04/29/2019       HCG (If Applicable):   Lab Results   Component Value Date    PREGTESTUR NEGATIVE 05/07/2019        ABGs: No results found for: PHART, PO2ART, XKI0ICS, BWX1ZEK, BEART, D0EITVQJ     Type & Screen (If Applicable):  No results found for: [facility]    Anesthesia Evaluation  Patient summary reviewed   history of anesthetic complications: PONV. Airway: Mallampati: II  TM distance: >3 FB   Neck ROM: full  Mouth opening: > = 3 FB Dental:          Pulmonary:normal exam  breath sounds clear to auscultation                             Cardiovascular:Negative CV ROS          ECG reviewed  Rhythm: regular  Rate: normal                 ROS comment: EKG: Normal Sinus Rhythm 100. Neuro/Psych:                ROS comment: H/O Fracture Tibia and Fibula. GI/Hepatic/Renal: Neg GI/Hepatic/Renal ROS           ROS comment: Rectal Fissure. .   Endo/Other:    (+) : arthritis:., .                  ROS comment: H/O Nosebleed. Abdominal:           Vascular:           ROS comment: Varicose veins Left LL. Longoria Challenger Anesthesia Plan      spinal     ASA 3     (Pt/INR/aPTT = 11.4/1.0/30.7  )  Induction: intravenous.   BIS  MIPS:

## 2019-05-07 NOTE — PROGRESS NOTES
Room #:   8382/5661-26  Patient Name: Westley Jung    PHYSICAL THERAPY INITIAL EVALUATION    Tentative placement recommendation: HHPT 5 days/week    Equipment recommendation: Casey Wesley and Tub transfer bench      Plan of care: Patient will be seen   twice daily;  for therapeutic exercise, functional retraining, endurance activities, balance exercises, family and patient education. AM-PAC Basic Mobility        AM-PAC Mobility Inpatient   How much difficulty turning over in bed?: A Lot  How much difficulty sitting down on / standing up from a chair with arms?: A Lot  How much difficulty moving from lying on back to sitting on side of bed?: A Lot  How much help from another person moving to and from a bed to a chair?: A Lot  How much help from another person needed to walk in hospital room?: A Lot  How much help from another person for climbing 3-5 steps with a railing?: A Lot  AM-PAC Inpatient Mobility Raw Score : 12  AM-PAC Inpatient T-Scale Score : 35.33  Mobility Inpatient CMS 0-100% Score: 68.66  Mobility Inpatient CMS G-Code Modifier : CL    Order:  EVALUATE AND TREAT    Diagnosis/Problem list: s/p RANDEE anterior approach PWB  1.  Arthritis of left hip        Patient Active Problem List   Diagnosis    Shoulder pain    Shoulder contusion    Shoulder sprain    Arthritis of left hip    Trauma    Fracture of right tibia    Fracture of right fibula    Degenerative arthritis of hip       Past medical history:       Diagnosis Date    Arthritis     left hip osteo arthritis    Fractures     right tibia fibula    Nosebleed 2011    history of bad bleed; one time only    PONV (postoperative nausea and vomiting)     sodium pentathol    PVC (premature ventricular contraction)     history of     Rectal fissure     Varicose veins of left lower extremity    ;      Procedure Laterality Date    CYST REMOVAL  2007    necrotized tissue; encapsulated; removed groin    FRACTURE SURGERY  10/2017 tibia, fibula right leg    KNEE SURGERY  1983    lateral release    OTHER SURGICAL HISTORY Right 09/26/2017    ORIF right lower tibia, fibula    WISDOM TOOTH EXTRACTION         The admitting diagnosis and active problem list as listed above have been reviewed prior to the initiation of this evaluation. 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 L hip    FUNCTIONAL ASSESSMENT   Bed Mobility- Supine to sit- Moderate assist          Scooting- Minimal assist       Sit to supine- Minimal assist      Transfers-Sit to stand- Minimal assist x 2     Gait:  Patient ambulated 3 side steps to head of bed  feet using wheeled walker with Minimal assist x 2     Steps:  Not assessed      Balance: sit-good         stand fair       Edema: yes - lle  Endurance: fair       Treatment:  Therapist educated and facilitated patient on techniques to increase safety and independence with bed mobility, balance, functional transfers, and functional mobility. Patient performed ankle pumps, quad sets, glut sets x 15-20 each. Patient instruced in hip precautions and PWB LLE. Sat EOB x 15 minutes to increase dynamic sitting balance and activity tolerance. Patient stood at bedside, with side steps to head of bed. Patient unable to ambulated further due to dizziness and nausea. Returned to bed. Patient demonstrating fair   understanding of education/techniques, requiring additional training/education.   At end of session, patient in bed with alarm, call light and phone within reach,   all lines and tubes intact, nursing notified. Pt would benefit from continued skilled PT to increase functional independence and quality of life. Rehab Potential: good     Patients Goal: home      ASSESSMENT  Patient exhibits decreased strength, balance, coordination impairing functional mobility. GOALS to be met in 1 days. Bed mobility-  Independent        Transfers-Sit to stand-Modified Independent     Gait:  Patient to ambulate 100 feet using wheeled walker with Modified Independent     Steps: Patient to go up and down 2  step(s) using - rail(s) with  Supervision     Increase strength in affected mm groups by 1/3 grade  Increase balance to allow for improvement towards functional goals. Increase endurance to allow for improvement towards functional goals.         Antonio Orosco, PT

## 2019-05-07 NOTE — ANESTHESIA POSTPROCEDURE EVALUATION
Department of Anesthesiology  Postprocedure Note    Patient: Delfina Allen  MRN: [id number]  YOB: 1970  Date of evaluation: 5/7/2019  Time:  11:35 AM     Procedure Summary     Date:  05/07/19 Room / [facility]    Anesthesia Start:  2179 Anesthesia Stop:  1101    Procedure:  LEFT HIP TOTAL ARTHROPLASTY WITH CELL SAVER ([address]) (Left ) Diagnosis:  (DJD HIP)    Surgeon:  David Jones DO Responsible Provider:  Vivianne Duverney, MD    Anesthesia Type:  spinal ASA Status:  3          Anesthesia Type: spinal    Bianca Phase I: Bianca Score: 5    Bianca Phase II:      Last vitals: Reviewed and per EMR flowsheets.        Anesthesia Post Evaluation    Patient location during evaluation: PACU  Patient participation: complete - patient participated  Level of consciousness: awake  Pain score: 0  Airway patency: patent  Nausea & Vomiting: no nausea  Complications: no  Cardiovascular status: blood pressure returned to baseline  Respiratory status: acceptable  Hydration status: euvolemic

## 2019-05-07 NOTE — CARE COORDINATION
SS NOTE: SW met with pt and her  in room today. Pt resides with her  juan f 2 story home with 2 step entry no railing. 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 Pt has no dme at home. She is requesting orders for a ww, bsc, and tub transfer bench. [facility]. SW will need all orders to complete those referrals for dch. Pt's PCP is Dr Rip Escalante. Corby Parker. 5/7/2019.3:39 PM.

## 2019-05-07 NOTE — H&P
History and Physical      CHIEF COMPLAINT:  L hip pain    HISTORY OF PRESENT ILLNESS:          Past Medical History:        Diagnosis Date    Arthritis     left hip osteo arthritis    Fractures     right tibia fibula    Nosebleed 2011    history of bad bleed; one time only    PONV (postoperative nausea and vomiting)     sodium pentathol    PVC (premature ventricular contraction)     history of     Rectal fissure     Varicose veins of left lower extremity      Past Surgical History:        Procedure Laterality Date    CYST REMOVAL  2007    necrotized tissue; encapsulated; removed groin    FRACTURE SURGERY  10/2017    tibia, fibula right leg    KNEE SURGERY  1983    lateral release    OTHER SURGICAL HISTORY Right 09/26/2017    ORIF right lower tibia, fibula    WISDOM TOOTH EXTRACTION       Social History:    TOBACCO:   reports that she has never smoked. She has never used smokeless tobacco.  ETOH:   reports that she drinks alcohol. DRUGS:   reports that she does not use drugs. Family History:       Problem Relation Age of Onset    Cancer Mother     Cancer Father     Cancer Maternal Grandmother      Medications Prior to Admission:  Medications Prior to Admission: hydrocortisone (ANUSOL-HC) 25 MG suppository, UNWRAP AND INSERT ONE SUPPOSITORY RECTALLY EVERY 12 HOURS  Omega-3 Fatty Acids (FISH OIL PO), Take by mouth daily  ibuprofen (IBU) 600 MG tablet, Take 1 tablet by mouth every 6 hours as needed for Pain for 90 doses. (Patient taking differently: Take 400 mg by mouth as needed for Pain )  Allergies:  Naproxen; Sulfa antibiotics;  Codeine; and Vicodin [hydrocodone-acetaminophen]    CONSTITUTIONAL:  negative for  chills and anorexia  HEENT:  negative for  tinnitus  RESPIRATORY:  negative for  dyspnea and cyanosis  CARDIOVASCULAR:  negative for  palpitations, syncope  GASTROINTESTINAL:  negative for vomiting and hematemesis  GENITOURINARY:  negative for hematuria  ENDOCRINE:  negative for

## 2019-05-07 NOTE — PLAN OF CARE
Problem: Discharge Planning:  Goal: Knowledge of discharge instructions  Description  Knowledge of discharge instructions  5/7/2019 1743 by Freddie Lopez RN  Outcome: Met This Shift     Problem: Infection - Surgical Site:  Goal: Signs of wound healing will improve  Description  Signs of wound healing will improve  5/7/2019 1743 by Freddie Lopez RN  Outcome: Met This Shift     Problem: Pain - Acute:  Goal: Pain level will decrease  Description  Pain level will decrease  5/7/2019 1743 by Freddie Lopez RN  Outcome: Met This Shift     Problem: Falls - Risk of:  Goal: Will remain free from falls  Description  Will remain free from falls  5/7/2019 1743 by Freddie Lopez RN  Outcome: Met This Shift     Problem: Pain:  Goal: Pain level will decrease  Description  Pain level will decrease  5/7/2019 1743 by Freddie Lopez RN  Outcome: Met This Shift     Problem: Pain:  Goal: Control of acute pain  Description  Control of acute pain  Outcome: Met This Shift

## 2019-05-07 NOTE — OP NOTE
retractor was placed on the anterior capsule to elevate the and sartorius which were mobilized somewhat in this area for exposure purposes. Retractors placed over the greater trochanter on the cephalad neck angle was placed inferior as well. The circumflex vessels were identified and coagulated. The capsular incision was then made and angle help elevate the capsule from the anterior neck and the intertrochanteric line. Dr. were used to maintain these positions. The neck was identified and a line was marked for neck resection. A napkin ring resection was done decompressing hip area and the head was removed and measured. The femur was loosened up 3 increasing the capsular release inferior and also cephalad. It was then retracted posterior to help expose the acetabulum. Rim osteophytes were removed as well as remaining labrum from the hip socket. This allowed introduction of reamers which were taken ultimately to a size of 49. Good bone preparation was noted and good depth on C-arm was used as guidance for version and inclination as well. Trialing ensure that the 50 would be a good fit. The definitive 50 mm [place] acetabular shell was then impacted in appropriate inclination and version and excellent stability was noted. The apex hole and manner was then placed and then the neutral 32 inner diameter polyliner was impacted and stability was ensured with left out test.    The femur was then positioned in external rotation and the inner margin of the greater trochanter was cleaned of soft tissues releasing the piriformis and the obturator internus. More inferior and a little posterior capsule release to improve exposure and the femur was then extended and abducted slightly to improve the exposure as well. The proximal hook under the femur was used to elevate this and then the [place] was brought up to take slack out of it. A box chisel and canal probe were used to open up the proximal femur and the small broach.  Then the Corail a standard broaches were placed in the femur up to a size 12. Excellent stability was noted with this. The cyst neutral neck length to hip ball was then applied and the hip was reduced. Good stability was noted and the hip was slight amount desirable shock. Films were then present from C-arm to compare to the native right hip for length and offset. These matched up well when symmetric magnification was used. The final components were chosen to match the trials. Removed after appropriate positioning. The hip area was thoroughly irrigated area    The definitive stem was then impacted with excellent stability and length was same as with the broaches for reference purposes. The ceramic hip ball was then impacted and the hip was reduced. Motion stability were excellent with testing for left out and dislocation. The hip was irrigated final time and then the capsule was closed with 0 Vicryl figure-of-eight sutures. The fascia of the tensor was then closed with absorbable suture as was the subcu and a Monocryl was used to close skin. Aquasol AG dressing was placed. Patient was and taken recovery in stable condition. GROSS PATHOLOGY: Advanced generation left hip with loss of joint space cephalad with slight erosion of the head and acetabulum and hypertrophic marginal changes with capsular contracture. COMPONENTS USED :  [email or web address] Corail size 12 femoral stem with neutral +5 neck length 32 mm ceramic hip ball. Pedicle 100 acetabular shell size 50 mm with apex hole and manner. 32 inner diameter neutral polyliner. All reasonable efforts have been made to minimize the risk of errors that may occur in the use of voice recognition and other electronic means of charting.     Electronically signed by Eric Trimble DO on 5/7/19 at 10:46 AM

## 2019-05-07 NOTE — PLAN OF CARE
Problem: Infection - Surgical Site:  Goal: Signs of wound healing will improve  Description  Signs of wound healing will improve  Outcome: Met This Shift     Problem: Pain - Acute:  Goal: Pain level will decrease  Description  Pain level will decrease  Outcome: Met This Shift  Note:   Goal pain 4 or less after pain meds. Problem: Falls - Risk of:  Goal: Will remain free from falls  Description  Will remain free from falls  Outcome: Met This Shift  Note:   Falls level 3,call light in reach,falls band on-yellow socks on. Bed alarm on.   Goal: Absence of physical injury  Description  Absence of physical injury  Outcome: Met This Shift

## 2019-05-07 NOTE — PROGRESS NOTES
Occupational Therapy  Occupational Therapy Initial Assessment    Date:2019  Patient Name: Carmine Meidna  MRN: [id number]  : 1970  ROOM #: [id number]    Placement Recommendation: [facility]   Equipment Prescriptions Needed: wheeled walker, bedside commode, tub transfer bench, dressing kit provided     [facility]   AM-PAC Daily Activity Inpatient   How much help for putting on and taking off regular lower body clothing?: Total  How much help for Bathing?: A Lot  How much help for Toileting?: A Lot  How much help for putting on and taking off regular upper body clothing?: A Little  How much help for taking care of personal grooming?: A Little  How much help for eating meals?: None  AM-PAC Inpatient Daily Activity Raw Score: 15  AM-PAC Inpatient ADL T-Scale Score : 34.69  ADL Inpatient CMS 0-100% Score: 56.46  ADL Inpatient CMS G-Code Modifier : CK    Diagnosis:   1. Arthritis of left hip      Past Medical History:   Past Medical History:   Diagnosis Date    Arthritis     left hip osteo arthritis    Fractures     right tibia fibula    Nosebleed     history of bad bleed; one time only    PONV (postoperative nausea and vomiting)     sodium pentathol    PVC (premature ventricular contraction)     history of     Rectal fissure     Varicose veins of left lower extremity          The admitting diagnosis and active problem list as listed above have been reviewed prior to the initiation of this evaluation. Nursing cleared the patient for evaluation. Patient is agreeable to evaluation. Precautions: falls, partial weight bearing: L LE d/t anterior RANDEE, nausea  Pain Scale: Numeric Rate: 3/10 pain in L hip; Nursing notified.     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 good  Problem solving skills   good  Memory    good  Sequencing  Communication: intact   Visual perceptual skills: intact     Glasses: yes   Edema: yes, surgical extremity     Sensation: intact   Hand Dominance:  Left     X  Right     Left Right Comment   Passive range of motion [employer] WFL     Active range of motion [employer]  [employer]     Muscle Grade 5/5 5/5    /pinch Strength Intact  Intact     [] Malnutrition indicators have been identified and nursing has been notified to ensure a dietitian consult is ordered. Function Assessment    Status  Goal Comment   Feeding:  Independent   Independent      Grooming:  Supervision  Independent      UE dressing:  Minimal Assist  Independent     LE dressing:  Dependent  Modified [place]     Bathing: Moderate Assist  Modified [place]     Bed Mobility:     Minimal Assist  for supine to sit,   Minimal Assist  for scooting,  Minimal Assist  for sit to supine  Independent     Functional Transfers:    Minimal Assist x 2 for sit to stand transfer from EOB to wheeled walker  Independent  Safety: good    Functional Mobility: 3 side steps towards head of bed with wheeled walker and minimal assist x 2 Modified [place]       Pt/family participated in establishment of goals. Balance:   Sitting: good   Standing: fair with wheeled walker    Endurance: fair       Assessment of Current Deficits:   [x]Functional mobility  []ROM  []Strength   []Cognition   []Safety Awareness    [x]ADLs [x]IADLs   [x]Endurance  [x]Balance    []Vision  []Sensation     []Gross Motor Coordination       []Fine Motor Coordination     · Low Complexity  · History: Brief history including review of medical records relating to the problem  · Exam: 1-3 performance Deficits  · Assistance/Modification: No assistance or modifications required to perform tasks. No comorbities affecting occupational performance.     Patients Goal: return home   Treatment: OT eval, nausea throughout evaluation, nursing administered nausea medication prior to therapy arrival, pt educated and trained in weight bearing status and dressing kit, pt educated in use of leg  prior to use, bed mobility with assistance to guide lower extremity off bed using leg , sitting balance at EOB for 10 minutes with supervision, transfer training with verbal cues for hand placement, static standing balance with wheeled walker, functional mobility with wheeled walker, verbal cues for walker sequence and safety, pt returned to bed at end of eval. Education provided for proper positioning of lower extremity in bed. All needs within reach. Rehab Potential: good   Plan of care: Patient will be seen by OT 2-4 times a week for therapeutic activity, ADL re-training, bed mobility, functional transfers, functional mobility, safety and fall prevention, balance and endurance activities, instruction in energy conservation principles, and patient/family education. Patient and/or family understands diagnosis, prognosis, education and plan of care. Pt/family verbalized understanding.      Time Code treatment minutes: Grant OTR/L #[id number]

## 2019-05-08 VITALS
DIASTOLIC BLOOD PRESSURE: 57 MMHG | OXYGEN SATURATION: 99 % | TEMPERATURE: 98.7 F | BODY MASS INDEX: 21 KG/M2 | RESPIRATION RATE: 16 BRPM | HEIGHT: 64 IN | SYSTOLIC BLOOD PRESSURE: 122 MMHG | WEIGHT: 123 LBS | HEART RATE: 78 BPM

## 2019-05-08 LAB
HCT VFR BLD CALC: 31.8 % (ref 34–48)
HEMOGLOBIN: 10.4 G/DL (ref 11.5–15.5)

## 2019-05-08 PROCEDURE — 6370000000 HC RX 637 (ALT 250 FOR IP): Performed by: ORTHOPAEDIC SURGERY

## 2019-05-08 PROCEDURE — 97530 THERAPEUTIC ACTIVITIES: CPT

## 2019-05-08 PROCEDURE — 36415 COLL VENOUS BLD VENIPUNCTURE: CPT

## 2019-05-08 PROCEDURE — 94150 VITAL CAPACITY TEST: CPT

## 2019-05-08 PROCEDURE — 6370000000 HC RX 637 (ALT 250 FOR IP): Performed by: STUDENT IN AN ORGANIZED HEALTH CARE EDUCATION/TRAINING PROGRAM

## 2019-05-08 PROCEDURE — 97116 GAIT TRAINING THERAPY: CPT

## 2019-05-08 PROCEDURE — 97535 SELF CARE MNGMENT TRAINING: CPT

## 2019-05-08 PROCEDURE — 97110 THERAPEUTIC EXERCISES: CPT

## 2019-05-08 PROCEDURE — 85014 HEMATOCRIT: CPT

## 2019-05-08 PROCEDURE — 85018 HEMOGLOBIN: CPT

## 2019-05-08 RX ORDER — HYDROCODONE BITARTRATE AND ACETAMINOPHEN 5; 325 MG/1; MG/1
1 TABLET ORAL EVERY 4 HOURS PRN
Qty: 42 TABLET | Refills: 0 | Status: SHIPPED | OUTPATIENT
Start: 2019-05-08 | End: 2019-05-15

## 2019-05-08 RX ORDER — ASPIRIN 325 MG
325 TABLET, DELAYED RELEASE (ENTERIC COATED) ORAL 2 TIMES DAILY
Qty: 56 TABLET | Refills: 0 | Status: SHIPPED | OUTPATIENT
Start: 2019-05-08 | End: 2019-05-08 | Stop reason: SDUPTHER

## 2019-05-08 RX ORDER — HYDROCODONE BITARTRATE AND ACETAMINOPHEN 5; 325 MG/1; MG/1
1 TABLET ORAL EVERY 4 HOURS PRN
Qty: 42 TABLET | Refills: 0 | Status: SHIPPED | OUTPATIENT
Start: 2019-05-08 | End: 2019-05-08 | Stop reason: SDUPTHER

## 2019-05-08 RX ORDER — ASPIRIN 325 MG
325 TABLET, DELAYED RELEASE (ENTERIC COATED) ORAL 2 TIMES DAILY
Qty: 56 TABLET | Refills: 0 | Status: SHIPPED | OUTPATIENT
Start: 2019-05-08 | End: 2019-06-26

## 2019-05-08 RX ADMIN — ASPIRIN 325 MG: 325 TABLET, DELAYED RELEASE ORAL at 08:35

## 2019-05-08 RX ADMIN — ACETAMINOPHEN 650 MG: 325 TABLET, FILM COATED ORAL at 06:09

## 2019-05-08 RX ADMIN — ACETAMINOPHEN 650 MG: 325 TABLET, FILM COATED ORAL at 13:04

## 2019-05-08 RX ADMIN — DOCUSATE SODIUM 100 MG: 100 CAPSULE, LIQUID FILLED ORAL at 08:35

## 2019-05-08 RX ADMIN — IBUPROFEN 400 MG: 400 TABLET ORAL at 08:35

## 2019-05-08 ASSESSMENT — PAIN DESCRIPTION - PROGRESSION
CLINICAL_PROGRESSION: GRADUALLY IMPROVING
CLINICAL_PROGRESSION: GRADUALLY IMPROVING

## 2019-05-08 ASSESSMENT — PAIN DESCRIPTION - DESCRIPTORS: DESCRIPTORS: ACHING

## 2019-05-08 ASSESSMENT — PAIN DESCRIPTION - PAIN TYPE: TYPE: SURGICAL PAIN

## 2019-05-08 ASSESSMENT — PAIN SCALES - GENERAL
PAINLEVEL_OUTOF10: 4
PAINLEVEL_OUTOF10: 0
PAINLEVEL_OUTOF10: 1
PAINLEVEL_OUTOF10: 3
PAINLEVEL_OUTOF10: 6
PAINLEVEL_OUTOF10: 3

## 2019-05-08 ASSESSMENT — PAIN DESCRIPTION - ORIENTATION: ORIENTATION: LEFT

## 2019-05-08 ASSESSMENT — PAIN DESCRIPTION - LOCATION: LOCATION: HIP

## 2019-05-08 NOTE — PROGRESS NOTES
Patient ambulated with stand by assist and front wheeled walker in [place] for 200 feet. Patient is partial weight bearing. Returned to bed. Tolerated well.

## 2019-05-08 NOTE — PLAN OF CARE
Problem: Discharge Planning:  Goal: Knowledge of discharge instructions  Description  Knowledge of discharge instructions  Outcome: Met This Shift  Note:   Will be discharged after medical equipment is delivered. Problem: Infection - Surgical Site:  Goal: Signs of wound healing will improve  Description  Signs of wound healing will improve  Outcome: Met This Shift     Problem: Pain - Acute:  Goal: Pain level will decrease  Description  Pain level will decrease  Outcome: Met This Shift  Note:   Pain goal a \"2-3\" after pain medicine,has been alternating between Tylenol and Motrin,patient reports this is effectively controlling her pain,not using any narcotics. Problem: Falls - Risk of:  Goal: Will remain free from falls  Description  Will remain free from falls  5/8/2019 1235 by Suellyn Kocher, RN  Outcome: Met This Shift  Note:   Falls level 3,falls band on-yellow socks on. Bed alarm on,call light within reach. 5/8/2019 0306 by Pete Guzmán RN  Outcome: Met This Shift  Goal: Absence of physical injury  Description  Absence of physical injury  5/8/2019 1235 by Suellyn Kocher, RN  Outcome: Met This Shift  5/8/2019 0306 by Pete Guzmán RN  Outcome: Met This Shift     Problem: Pain:  Goal: Pain level will decrease  Description  Pain level will decrease  Outcome: Met This Shift  Note:   Pain goal a \"2-3\" after pain medicine,has been alternating between Tylenol and Motrin,patient reports this is effectively controlling her pain,not using any narcotics.   Goal: Control of acute pain  Description  Control of acute pain  5/8/2019 1235 by Suellyn Kocher, RN  Outcome: Met This Shift  5/8/2019 0306 by Pete Guzmán RN  Outcome: Met This Shift  Note:   Pain will remain < 5 on a 0-10 scale during my shift

## 2019-05-08 NOTE — PLAN OF CARE
Problem: Falls - Risk of:  Goal: Will remain free from falls  Description  Will remain free from falls  5/8/2019 0306 by Neha Winston RN  Outcome: Met This Shift     Problem: Falls - Risk of:  Goal: Absence of physical injury  Description  Absence of physical injury  5/8/2019 0306 by Neha Winston RN  Outcome: Met This Shift     Problem: Pain:  Goal: Control of acute pain  Description  Control of acute pain  5/8/2019 0306 by Neha Winston RN  Outcome: Met This Shift  Note:   Pain will remain < 5 on a 0-10 scale during my shift

## 2019-05-08 NOTE — PROGRESS NOTES
Department of Orthopedic Surgery  Resident Progress Note    Patient seen and examined. Sitting at bedside. Nursing reports that patient walked over 300 feet overnight. Patient reports that she had some nausea/vomiting from the pain medication, therefore she decided to just take ibuprofen. Ibuprofen is working well for her pain. No new complaints. Denies chest pain, shortness of breath, calf pain, dizziness/lightheadedness. VITALS:  /69   Pulse 88   Temp 98.7 °F (37.1 °C) (Oral)   Resp 18   Ht 5' 4\" (1.626 m)   Wt 123 lb (55.8 kg)   LMP 04/08/2019   SpO2 99%   BMI 21.11 kg/m²     GENERAL: Awake, alert  MUSCULOSKELETAL:   left lower extremity:  · Aquacel dressing C/D/I  · Numbness to lateral thigh  · Compartments soft and compressible, calf non-tender  · Palpable dorsalis pedis and posterior tibialis pulse, brisk cap refill to toes, foot warm and perfused  · Sensation intact to light touch in sural/deep peroneal/superficial peroneal/saphenous/posterior tibial nerve distributions to foot/ankle. · Demonstrates active ankle plantar/dorsiflexion/great toe extension    CBC:   Lab Results   Component Value Date    WBC 5.8 04/29/2019    HGB 10.4 05/08/2019    HCT 31.8 05/08/2019     04/29/2019       ASSESSMENT  · L DA hip 5/7    PLAN    Weight bearing as tolerated LLE  24 hour abx coverage  Deep venous thrombosis prophylaxis - Aspirin, early mobilization  PT/OT  Pain Control: IV and PO, okay to take Ibuprofen for pain as patient is sensitive to Norco  Monitor H&H  D/c planning: Okay for discharge home today. To follow with Dr. Aviva Hunt in office.

## 2019-05-08 NOTE — CARE COORDINATION
SS NOTE: Per liaison from [address] [facility] pt's ww and tub transfer bench will be delivered to pt's room after 2 PM today. [facility] orders recd and that referral is now completed. Pt plans on returning home today. Corby Parker. 5/8/2019.1:17 PM.

## 2019-05-08 NOTE — PROGRESS NOTES
Occupational Therapy  O.T. Bedside Treatment Note    Date:2019  Patient Name: Betina Velasco  MRN: [id number]  : 1970  ROOM #: [id number]    Problem list / Diagnosis:   Patient Active Problem List   Diagnosis    Shoulder pain    Shoulder contusion    Shoulder sprain    Arthritis of left hip    Trauma    Fracture of right tibia    Fracture of right fibula    Degenerative arthritis of hip     Past Medical History:   Past Medical History:   Diagnosis Date    Arthritis     left hip osteo arthritis    Fractures     right tibia fibula    Nosebleed     history of bad bleed; one time only    PONV (postoperative nausea and vomiting)     sodium pentathol    PVC (premature ventricular contraction)     history of     Rectal fissure     Varicose veins of left lower extremity      Onset/Medical history: medical history reviewed  Past medical history: reviewed    The admitting diagnosis and active problem list as listed above have been reviewed prior to treatment. Nursing cleared the patient for treatment. Patient is agreeable to treatment. Discharge recommendations: [facility] prescriptions needed: wheeled walker, extended tub transfer bench  Comment: pt declined bedside commode; hip kit provided at [facility] Daily Activity Inpatient    AM-PAC Daily Activity Inpatient   How much help for putting on and taking off regular lower body clothing?: A Little  How much help for Bathing?: A Little  How much help for Toileting?: A Little  How much help for putting on and taking off regular upper body clothing?: A Little  How much help for taking care of personal grooming?: A Little  How much help for eating meals?: None  AM-PAC Inpatient Daily Activity Raw Score: 19      Precautions: falls, partial weight bearing: L LE d/t anterior RANDEE, nausea      S:  - Pt cleared for treatment through nursing.  - Pain: pt c/o 1/10 pain in L hip. Nsg aware. Pt states she took tylenol before session.  Pt FWW; 20 ft x 2 and 6 ft x 2 with supervision with FWW Modified [place]  No LOB noted; cuing for joint protection, safety, sequencing        A:  -Balance: Sitting: fair at EOB for LE dressing with use of AE, as well as fair sitting balance on multiple surfaces in clinic and in w/c       Standing: fair with Minimal Assist progressing to supervision with FWW  -Endurance: fair   -Edema: yes - LLE; nsg aware; positioning provided; SCD's donned, HAROON hose donned  -Safety: good (VC's for walker safety, sequencing, hand placement, joint protection)  - Pt/family education/training: bed mobility, transfer training, functional mobility, AE for LE dressing, LE dressing technique,  toileting/hygiene, sequencing, hand placement, walker safety, bathroom safety, DME, ECT's,  joint protection techniques,  grooming, ADL retraining, self-feeding  - Increased time d/t multiple tasks completed in room and in clinic.  -Pt would benefit from additional ADLs, safety education, balance activities, transfer training, strength exercises, and over all endurance building.     P:  - Plan of care: Patient will be D/C'd with HHOT PRN and family supervision/assist.   - Patient and/or family understands diagnosis, prognosis and plan of care: yes     Treatment minutes: Shu Britton 484, [address] Watts              \

## 2019-05-08 NOTE — PROGRESS NOTES
Physical Therapy  PHYSICAL THERAPY  TREATMENT NOTE    5/8/2019  0316/0316-01                      Patient Name: Reshma Shukla      [id number]                              1970     Recommendation for discharge: HHPT 5x/week  Equipment prescriptions needed: to be determined    AM-PAC Mobility Inpatient   How much difficulty turning over in bed?: None  How much difficulty sitting down on / standing up from a chair with arms?: None  How much difficulty moving from lying on back to sitting on side of bed?: None  How much help from another person moving to and from a bed to a chair?: A Little  How much help from another person needed to walk in hospital room?: A Little  How much help from another person for climbing 3-5 steps with a railing?: A Little  AM-[facility] Inpatient Mobility Raw Score : 21  AM-PAC Inpatient T-Scale Score : 50.25  Mobility Inpatient CMS 0-100% Score: 28.97  Mobility Inpatient CMS G-Code Modifier : CJ      Patient Active Problem List   Diagnosis    Shoulder pain    Shoulder contusion    Shoulder sprain    Arthritis of left hip    Trauma    Fracture of right tibia    Fracture of right fibula    Degenerative arthritis of hip         Weight bearing/Precautions: LLE: PWB  Anterior Approach, No SLR    S: Patient cleared by nursing for treatment. Patient is agreeable to treatment. Pt c/o pain with her L groin area. Pain status: (measured on a visual analog scale with 0=no pain and 10=excruciating pain) 1/10. O: Pt was instructed in and performed the following:   Bed Mobility-  Supine to sit-Independent       Scooting- Independent      Sit to supine-NA     Transfers-sit to stand- Supervision    Gait:  Patient ambulated 60 feet using Simran Spina with Supervision. Verbal cues required for sequencing, safety, upright posture, weight bearing,  increased base of support,    Steps: Pt ascended/descended 14 steps using 2 hand rails with Supervision. V/C for sequencing and safety.     Treatment: Pt performed supine ex's: ankle pumps, gluteal sets, quad sets, heel slides, hip abduction, hip adduction,    x 15 reps. V/C for technique. Pt instructed on using a leg  for bed mobility, to assist with exercises flexion and extension, proper hand placement and increased knee flexion with each sit to stand transfer and to elevate her L leg higher than her heart while sitting more than 1 hour or more and in bed to decrease swelling. Pt ambulated in the hallway and performed stair training. Family education/handouts: Pt's  present during tx session. He was instructed on proper hand placement for bed mobility and supine exercises. Comment: Call light left by patient. Pt left in care of ARRIOLA (Nini Blum) at end of tx session. A: Pt able to progress with increased distance and perform stairs with no LOB. Pt is Independent/Supervision with all functional mobility. P: Continue with physical therapy    NAYELI MARTINEZ, PTA   GOALS to be met in 1 days. Bed mobility-  Independent                                         Transfers-Sit to stand-Modified Independent                Gait:  Patient to ambulate 100 feet using wheeled walker with Modified Independent                Steps: Patient to go up and down 2  step(s) using - rail(s) with  Supervision     Increase strength in affected mm groups by 1/3 grade  Increase balance to allow for improvement towards functional goals.   Increase endurance to allow for improvement towards functional goals.

## 2019-05-13 DIAGNOSIS — M16.12 PRIMARY OSTEOARTHRITIS OF LEFT HIP: Primary | ICD-10-CM

## 2019-05-13 DIAGNOSIS — Z96.642 HISTORY OF LEFT HIP REPLACEMENT: ICD-10-CM

## 2019-05-31 ENCOUNTER — OFFICE VISIT (OUTPATIENT)
Dept: ORTHOPEDIC SURGERY | Age: 49
End: 2019-05-31

## 2019-05-31 VITALS — WEIGHT: 123 LBS | BODY MASS INDEX: 21 KG/M2 | HEIGHT: 64 IN

## 2019-05-31 DIAGNOSIS — Z96.642 HISTORY OF LEFT HIP REPLACEMENT: Primary | ICD-10-CM

## 2019-05-31 PROCEDURE — 99024 POSTOP FOLLOW-UP VISIT: CPT | Performed by: ORTHOPAEDIC SURGERY

## 2019-05-31 NOTE — PROGRESS NOTES
Chief Complaint:   Chief Complaint   Patient presents with    Post-Op Check     post-op from left THR DOS 5/7/19, patient is going to PT. Westley Jung  is 24 days postop direct anterior left total hip replacement arthroplasty. She's doing very well. She had some feelings at the leg length was long early on but she is adjusting to this is getting some back pain perhaps from adjustment relative to equalization of the length of her hips. She is increasing her motion and her exercise and is doing quite well noticing that she is doing more every day. She has little pain. She has had a little swelling in the incision area but no opening and no drainage. This prominence is getting smaller and more firm progressively. She had questions about sitting on a bicycle seat and also on other exercise modalities. Allergies; medications; past medical, surgical, family, and social history; and problem list have been reviewed today and updated as indicated in this encounter seen below. Exam: The wound edges are well approximated. There are no fixation devices in place. There is some prominence which is soft suggesting a subcutaneous hematoma without complication. There is no increased temperature or redness or pain around the area. She is able to demonstrate a balanced gait with good pace. She is weightbearing on the left lower extremity without hesitation. Range of motion of the left hip is a little bit guarded and rotation but overall is very good and is stable without much apprehension at all. Her calf is supple as well in her knee but well. In general stance her spine seems to be pretty well aligned. Radiographs: Postoperative imaging was reviewed with Renata Zamora and her . ASSESSMENT:    Renata Zamora was seen today for post-op check. Diagnoses and all orders for this visit:    History of left hip replacement        PLAN: Continue gradual increase in exercise and activity. Will follow up in 4 weeks.  She was cautioned about bicycle use in the short-term here however she has 1 bicycle which is set up as a stationary bike and may ride this some with care swinging her leg over the frame to dismount. Return in about 1 month (around 6/28/2019). Current Outpatient Medications   Medication Sig Dispense Refill    aspirin 325 MG EC tablet Take 1 tablet by mouth 2 times daily for 28 days 56 tablet 0    Omega-3 Fatty Acids (FISH OIL PO) Take by mouth daily      ibuprofen (IBU) 600 MG tablet Take 1 tablet by mouth every 6 hours as needed for Pain for 90 doses. (Patient taking differently: Take 400 mg by mouth as needed for Pain ) 90 tablet 1     No current facility-administered medications for this visit.         Patient Active Problem List   Diagnosis    Shoulder pain    Shoulder contusion    Shoulder sprain    Arthritis of left hip    Trauma    Fracture of right tibia    Fracture of right fibula    Degenerative arthritis of hip       Past Medical History:   Diagnosis Date    Arthritis     left hip osteo arthritis    Fractures     right tibia fibula    Nosebleed 2011    history of bad bleed; one time only    PONV (postoperative nausea and vomiting)     sodium pentathol    PVC (premature ventricular contraction)     history of     Rectal fissure     Varicose veins of left lower extremity        Past Surgical History:   Procedure Laterality Date    CYST REMOVAL  2007    necrotized tissue; encapsulated; removed groin    FRACTURE SURGERY  10/2017    tibia, fibula right leg    KNEE SURGERY  1983    lateral release    OTHER SURGICAL HISTORY Right 09/26/2017    ORIF right lower tibia, fibula    TOTAL HIP ARTHROPLASTY Left 5/7/2019    LEFT HIP TOTAL ARTHROPLASTY WITH CELL SAVER (DEPUY) performed by Anand Zimmer DO at [address] EXTRACTION         Allergies   Allergen Reactions    Naproxen Other (See Comments)     Rectal bleeding, fever    Sulfa Antibiotics Hives and Itching    Codeine Nausea And Vomiting    Vicodin [Hydrocodone-Acetaminophen] Nausea And Vomiting       Social History     Socioeconomic History    Marital status:      Spouse name: None    Number of children: None    Years of education: None    Highest education level: None   Occupational History    None   Social Needs    Financial resource strain: None    Food insecurity:     Worry: None     Inability: None    Transportation needs:     Medical: None     Non-medical: None   Tobacco Use    Smoking status: Never Smoker    Smokeless tobacco: Never Used   Substance and Sexual Activity    Alcohol use: Yes     Comment: occas. beer or wine    Drug use: No    Sexual activity: None   Lifestyle    Physical activity:     Days per week: None     Minutes per session: None    Stress: None   Relationships    Social connections:     Talks on phone: None     Gets together: None     Attends Buddhism service: None     Active member of club or organization: None     Attends meetings of clubs or organizations: None     Relationship status: None    Intimate partner violence:     Fear of current or ex partner: None     Emotionally abused: None     Physically abused: None     Forced sexual activity: None   Other Topics Concern    None   Social History Narrative    None       Review of Systems  As follows except as previously noted in HPI:  Constitutional: Negative for chills, diaphoresis, fatigue, fever and unexpected weight change. Respiratory: Negative for cough, shortness of breath and wheezing. Cardiovascular: Negative for chest pain and palpitations. Neurological: Negative for dizziness, syncope, cephalgia. GI / : negative  Musculoskeletal: see HPI       Objective:   Physical Exam   Constitutional: Oriented to person, place, and time. and appears well-developed and well-nourished. :   Head: Normocephalic and atraumatic. Eyes: EOM are normal.   Neck: Neck supple. Cardiovascular: Normal rate and regular rhythm. Pulmonary/Chest: Effort normal. No stridor. No respiratory distress, no wheezes. Abdominal:  No abnormal distension. Neurological: Alert and oriented to person, place, and time. Skin: Skin is warm and dry. Psychiatric: Normal mood and affect.  Behavior is normal. Thought content normal.    FAYE Preciado DO    5/31/19  11:47 AM

## 2019-06-26 ENCOUNTER — OFFICE VISIT (OUTPATIENT)
Dept: ORTHOPEDIC SURGERY | Age: 49
End: 2019-06-26

## 2019-06-26 VITALS — BODY MASS INDEX: 21.62 KG/M2 | WEIGHT: 122 LBS | HEIGHT: 63 IN

## 2019-06-26 DIAGNOSIS — Z96.642 HISTORY OF LEFT HIP REPLACEMENT: Primary | ICD-10-CM

## 2019-06-26 PROCEDURE — 99024 POSTOP FOLLOW-UP VISIT: CPT | Performed by: ORTHOPAEDIC SURGERY

## 2019-06-26 NOTE — PROGRESS NOTES
Chief Complaint:   Chief Complaint   Patient presents with    Post-Op Check     post-op from left THR DOS 5/7/19, patient is doing well. Bailey Noble is  7 weeks postop left hip replacement arthroplasty direct anterior. She has a little area of numbness but sensation is getting better overall. She has some tightness and a little bit of soreness along the incision line she feels is from tight muscles and soft tissues. She is working on stretching that as well. She had lots of questions as far as riding her bicycle outside, about stretching her hip and about certain other activities and also antibiotics around dental work. Allergies; medications; past medical, surgical, family, and social history; and problem list have been reviewed today and updated as indicated in this encounter seen below. Exam: The incision is well-healed. There is little tenderness little firmness around the area as expected. This is quite typical and that was shared with her. She has good range of motion and stability and has equal leg length on gross examination. We discussed the antibiotics which by current recommendations for tooth cleaning are not always necessary. For more aggressive treatment with a R and we would prescribe that. She may ride her bicycle outside and staying out of the high traffic areas in hazardous areas. Touching of the hip is fine      ASSESSMENT:    Judith Fuentes was seen today for post-op check. Diagnoses and all orders for this visit:    History of left hip replacement        PLAN: Increase activity as tolerated. We will follow-up in about 8 weeks. Return in about 2 months (around 8/26/2019). Current Outpatient Medications   Medication Sig Dispense Refill    Omega-3 Fatty Acids (FISH OIL PO) Take by mouth daily      ibuprofen (IBU) 600 MG tablet Take 1 tablet by mouth every 6 hours as needed for Pain for 90 doses.  (Patient taking differently: Take 400 mg by mouth as needed for Pain ) 90 tablet 1     No current facility-administered medications for this visit. Patient Active Problem List   Diagnosis    Shoulder pain    Shoulder contusion    Shoulder sprain    Arthritis of left hip    Trauma    Fracture of right tibia    Fracture of right fibula    Degenerative arthritis of hip       Past Medical History:   Diagnosis Date    Arthritis     left hip osteo arthritis    Fractures     right tibia fibula    Nosebleed 2011    history of bad bleed; one time only    PONV (postoperative nausea and vomiting)     sodium pentathol    PVC (premature ventricular contraction)     history of     Rectal fissure     Varicose veins of left lower extremity        Past Surgical History:   Procedure Laterality Date    CYST REMOVAL  2007    necrotized tissue; encapsulated; removed groin    FRACTURE SURGERY  10/2017    tibia, fibula right leg    KNEE SURGERY  1983    lateral release    OTHER SURGICAL HISTORY Right 09/26/2017    ORIF right lower tibia, fibula    TOTAL HIP ARTHROPLASTY Left 5/7/2019    LEFT HIP TOTAL ARTHROPLASTY WITH CELL SAVER (DEPUY) performed by Kaiden Reis DO at [address] [place] [address] Avenue EXTRACTION         Allergies   Allergen Reactions    Naproxen Other (See Comments)     Rectal bleeding, fever    Sulfa Antibiotics Hives and Itching    Codeine Nausea And Vomiting    Vicodin [Hydrocodone-Acetaminophen] Nausea And Vomiting       Social History     Socioeconomic History    Marital status:      Spouse name: None    Number of children: None    Years of education: None    Highest education level: None   Occupational History    None   Social Needs    Financial resource strain: None    Food insecurity:     Worry: None     Inability: None    Transportation needs:     Medical: None     Non-medical: None   Tobacco Use    Smoking status: Never Smoker    Smokeless tobacco: Never Used   Substance and Sexual Activity    Alcohol use: Yes     Comment: occas. beer or wine    Drug use: No    Sexual activity: None   Lifestyle    Physical activity:     Days per week: None     Minutes per session: None    Stress: None   Relationships    Social connections:     Talks on phone: None     Gets together: None     Attends Anabaptist service: None     Active member of club or organization: None     Attends meetings of clubs or organizations: None     Relationship status: None    Intimate partner violence:     Fear of current or ex partner: None     Emotionally abused: None     Physically abused: None     Forced sexual activity: None   Other Topics Concern    None   Social History Narrative    None       Review of Systems  As follows except as previously noted in HPI:  Constitutional: Negative for chills, diaphoresis, fatigue, fever and unexpected weight change. Respiratory: Negative for cough, shortness of breath and wheezing. Cardiovascular: Negative for chest pain and palpitations. Neurological: Negative for dizziness, syncope, cephalgia. GI / : negative  Musculoskeletal: see HPI       Objective:   Physical Exam   Constitutional: Oriented to person, place, and time. and appears well-developed and well-nourished. :   Head: Normocephalic and atraumatic. Eyes: EOM are normal.   Neck: Neck supple. Cardiovascular: Normal rate and regular rhythm. Pulmonary/Chest: Effort normal. No stridor. No respiratory distress, no wheezes. Abdominal:  No abnormal distension. Neurological: Alert and oriented to person, place, and time. Skin: Skin is warm and dry. Psychiatric: Normal mood and affect.  Behavior is normal. Thought content normal.    FAYE Mccollum DO    6/26/19  9:14 AM

## 2019-08-20 DIAGNOSIS — Z96.642 HISTORY OF LEFT HIP REPLACEMENT: Primary | ICD-10-CM

## 2019-08-21 ENCOUNTER — OFFICE VISIT (OUTPATIENT)
Dept: ORTHOPEDIC SURGERY | Age: 49
End: 2019-08-21
Payer: COMMERCIAL

## 2019-08-21 VITALS — BODY MASS INDEX: 21.79 KG/M2 | HEIGHT: 63 IN | TEMPERATURE: 98 F | WEIGHT: 123 LBS

## 2019-08-21 DIAGNOSIS — Z96.642 HISTORY OF LEFT HIP REPLACEMENT: Primary | ICD-10-CM

## 2019-08-21 PROCEDURE — 1036F TOBACCO NON-USER: CPT | Performed by: NURSE PRACTITIONER

## 2019-08-21 PROCEDURE — 99213 OFFICE O/P EST LOW 20 MIN: CPT | Performed by: NURSE PRACTITIONER

## 2019-08-21 PROCEDURE — G8427 DOCREV CUR MEDS BY ELIG CLIN: HCPCS | Performed by: NURSE PRACTITIONER

## 2019-08-21 PROCEDURE — G8420 CALC BMI NORM PARAMETERS: HCPCS | Performed by: NURSE PRACTITIONER

## 2019-08-21 RX ORDER — CEPHALEXIN 500 MG/1
2000 CAPSULE ORAL DAILY PRN
Qty: 4 CAPSULE | Refills: 3 | Status: SHIPPED | OUTPATIENT
Start: 2019-08-21 | End: 2019-11-25 | Stop reason: ALTCHOICE

## 2019-08-21 RX ORDER — FLUCONAZOLE 150 MG/1
150 TABLET ORAL ONCE
Qty: 1 TABLET | Refills: 3 | Status: SHIPPED | OUTPATIENT
Start: 2019-08-21 | End: 2019-08-21

## 2019-08-21 NOTE — PROGRESS NOTES
Chief Complaint   Patient presents with    Hip Pain     Left Hip, RANDEE, DOS 5/7/19       Roseline Plaza returns today for follow-up of her left RANDEE. DOS: 5/7/19. She reports this is better than when I saw the patient last.     Past Medical History:   Diagnosis Date    Arthritis     left hip osteo arthritis    Fractures     right tibia fibula    Nosebleed 2011    history of bad bleed; one time only    PONV (postoperative nausea and vomiting)     sodium pentathol    PVC (premature ventricular contraction)     history of     Rectal fissure     Varicose veins of left lower extremity      Past Surgical History:   Procedure Laterality Date    CYST REMOVAL  2007    necrotized tissue; encapsulated; removed groin    FRACTURE SURGERY  10/2017    tibia, fibula right leg    KNEE SURGERY  1983    lateral release    OTHER SURGICAL HISTORY Right 09/26/2017    ORIF right lower tibia, fibula    TOTAL HIP ARTHROPLASTY Left 5/7/2019    LEFT HIP TOTAL ARTHROPLASTY WITH CELL SAVER (DEPUY) performed by Amparo Belcher DO at [address] [place] [address] EXTRACTION         Current Outpatient Medications:     Omega-3 Fatty Acids (FISH OIL PO), Take by mouth daily, Disp: , Rfl:     ibuprofen (IBU) 600 MG tablet, Take 1 tablet by mouth every 6 hours as needed for Pain for 90 doses.  (Patient taking differently: Take 400 mg by mouth as needed for Pain ), Disp: 90 tablet, Rfl: 1  Allergies   Allergen Reactions    Naproxen Other (See Comments)     Rectal bleeding, fever    Sulfa Antibiotics Hives and Itching    Codeine Nausea And Vomiting    Vicodin [Hydrocodone-Acetaminophen] Nausea And Vomiting     Social History     Socioeconomic History    Marital status:      Spouse name: Not on file    Number of children: Not on file    Years of education: Not on file    Highest education level: Not on file   Occupational History    Not on file   Social Needs    Financial resource strain: Not on file    Food insecurity:

## 2019-11-21 DIAGNOSIS — Z96.642 HISTORY OF LEFT HIP REPLACEMENT: Primary | ICD-10-CM

## 2019-11-25 ENCOUNTER — OFFICE VISIT (OUTPATIENT)
Dept: ORTHOPEDIC SURGERY | Age: 49
End: 2019-11-25
Payer: COMMERCIAL

## 2019-11-25 VITALS — BODY MASS INDEX: 21.44 KG/M2 | WEIGHT: 121 LBS | HEIGHT: 63 IN

## 2019-11-25 DIAGNOSIS — Z96.642 HISTORY OF LEFT HIP REPLACEMENT: Primary | ICD-10-CM

## 2019-11-25 PROCEDURE — G8427 DOCREV CUR MEDS BY ELIG CLIN: HCPCS | Performed by: ORTHOPAEDIC SURGERY

## 2019-11-25 PROCEDURE — 1036F TOBACCO NON-USER: CPT | Performed by: ORTHOPAEDIC SURGERY

## 2019-11-25 PROCEDURE — G8420 CALC BMI NORM PARAMETERS: HCPCS | Performed by: ORTHOPAEDIC SURGERY

## 2019-11-25 PROCEDURE — G8484 FLU IMMUNIZE NO ADMIN: HCPCS | Performed by: ORTHOPAEDIC SURGERY

## 2019-11-25 PROCEDURE — 99213 OFFICE O/P EST LOW 20 MIN: CPT | Performed by: ORTHOPAEDIC SURGERY

## 2020-01-08 ENCOUNTER — OFFICE VISIT (OUTPATIENT)
Dept: ORTHOPEDIC SURGERY | Age: 50
End: 2020-01-08
Payer: COMMERCIAL

## 2020-01-08 VITALS — BODY MASS INDEX: 21.45 KG/M2 | HEIGHT: 63 IN | WEIGHT: 121.03 LBS

## 2020-01-08 PROCEDURE — G8420 CALC BMI NORM PARAMETERS: HCPCS | Performed by: ORTHOPAEDIC SURGERY

## 2020-01-08 PROCEDURE — G8427 DOCREV CUR MEDS BY ELIG CLIN: HCPCS | Performed by: ORTHOPAEDIC SURGERY

## 2020-01-08 PROCEDURE — 1036F TOBACCO NON-USER: CPT | Performed by: ORTHOPAEDIC SURGERY

## 2020-01-08 PROCEDURE — G8484 FLU IMMUNIZE NO ADMIN: HCPCS | Performed by: ORTHOPAEDIC SURGERY

## 2020-01-08 PROCEDURE — 99213 OFFICE O/P EST LOW 20 MIN: CPT | Performed by: ORTHOPAEDIC SURGERY

## 2020-01-08 NOTE — PROGRESS NOTES
consistent in density with adjacent subcu fat. It is very thin layer and is not felt to represent anything pathologic. No treatment is felt to be necessary at this time. PLAN: Observation for any change in character of the prominence. She was also encouraged to engage a little more aerobic exercise involving the upper extremities which may help tone up her arms in general and reduce the prominence of this area. As always she was encouraged to call us with any further concerns. Patient Active Problem List   Diagnosis    Shoulder pain    Shoulder contusion    Shoulder sprain    Arthritis of left hip    Trauma    Fracture of right tibia    Fracture of right fibula    Degenerative arthritis of hip       Past Medical History:   Diagnosis Date    Arthritis     left hip osteo arthritis    Fractures     right tibia fibula    Nosebleed 2011    history of bad bleed; one time only    PONV (postoperative nausea and vomiting)     sodium pentathol    PVC (premature ventricular contraction)     history of     Rectal fissure     Varicose veins of left lower extremity        Past Surgical History:   Procedure Laterality Date    CYST REMOVAL  2007    necrotized tissue; encapsulated; removed groin    FRACTURE SURGERY  10/2017    tibia, fibula right leg    KNEE SURGERY  1983    lateral release    OTHER SURGICAL HISTORY Right 09/26/2017    ORIF right lower tibia, fibula    TOTAL HIP ARTHROPLASTY Left 5/7/2019    LEFT HIP TOTAL ARTHROPLASTY WITH CELL SAVER (DEPUY) performed by Aida Mckinney DO at [address] [place] EXTRACTION         Current Outpatient Medications   Medication Sig Dispense Refill    Omega-3 Fatty Acids (FISH OIL PO) Take by mouth daily      ibuprofen (IBU) 600 MG tablet Take 1 tablet by mouth every 6 hours as needed for Pain for 90 doses.  (Patient taking differently: Take 400 mg by mouth as needed for Pain ) 90 tablet 1     No current facility-administered medications for cramping  Musculoskeletal: see HPI       Objective:   Physical Exam   Constitutional: Oriented to person, place, and time. and appears well-developed and well-nourished. :   Head: Normocephalic and atraumatic. Neck: Neck supple. Eyes: EOM are normal.   Pulmonary/Chest: Effort normal.  No respiratory distress, no wheezes. Neurological: Alert and oriented to person  Skin: Skin is warm and dry. Ky Shaw DO    1/8/20  4:09 PM    All reasonable efforts have been made to minimize the risk of errors that may occur in the use of voice recognition and other electronic means of charting.

## 2020-06-03 ENCOUNTER — OFFICE VISIT (OUTPATIENT)
Dept: ORTHOPEDIC SURGERY | Age: 50
End: 2020-06-03
Payer: COMMERCIAL

## 2020-06-03 VITALS — TEMPERATURE: 98 F | BODY MASS INDEX: 21.62 KG/M2 | HEIGHT: 63 IN | WEIGHT: 122 LBS

## 2020-06-03 PROCEDURE — G8420 CALC BMI NORM PARAMETERS: HCPCS | Performed by: ORTHOPAEDIC SURGERY

## 2020-06-03 PROCEDURE — 3017F COLORECTAL CA SCREEN DOC REV: CPT | Performed by: ORTHOPAEDIC SURGERY

## 2020-06-03 PROCEDURE — G8427 DOCREV CUR MEDS BY ELIG CLIN: HCPCS | Performed by: ORTHOPAEDIC SURGERY

## 2020-06-03 PROCEDURE — 99213 OFFICE O/P EST LOW 20 MIN: CPT | Performed by: ORTHOPAEDIC SURGERY

## 2020-06-03 PROCEDURE — 1036F TOBACCO NON-USER: CPT | Performed by: ORTHOPAEDIC SURGERY

## 2020-06-03 NOTE — PROGRESS NOTES
facility-administered medications for this visit. Patient Active Problem List   Diagnosis    Shoulder pain    Shoulder contusion    Shoulder sprain    Arthritis of left hip    Trauma    Fracture of right tibia    Fracture of right fibula    Degenerative arthritis of hip       Past Medical History:   Diagnosis Date    Arthritis     left hip osteo arthritis    Fractures     right tibia fibula    Nosebleed 2011    history of bad bleed; one time only    PONV (postoperative nausea and vomiting)     sodium pentathol    PVC (premature ventricular contraction)     history of     Rectal fissure     Varicose veins of left lower extremity        Past Surgical History:   Procedure Laterality Date    CYST REMOVAL  2007    necrotized tissue; encapsulated; removed groin    FRACTURE SURGERY  10/2017    tibia, fibula right leg    KNEE SURGERY  1983    lateral release    OTHER SURGICAL HISTORY Right 09/26/2017    ORIF right lower tibia, fibula    TOTAL HIP ARTHROPLASTY Left 5/7/2019    LEFT HIP TOTAL ARTHROPLASTY WITH CELL SAVER (DEPUY) performed by Andreia Morfin DO at [facility]         Allergies   Allergen Reactions    Naproxen Other (See Comments)     Rectal bleeding, fever    Sulfa Antibiotics Hives and Itching    Codeine Nausea And Vomiting    Vicodin [Hydrocodone-Acetaminophen] Nausea And Vomiting       Social History     Socioeconomic History    Marital status:      Spouse name: None    Number of children: None    Years of education: None    Highest education level: None   Occupational History    None   Social Needs    Financial resource strain: None    Food insecurity     Worry: None     Inability: None    Transportation needs     Medical: None     Non-medical: None   Tobacco Use    Smoking status: Never Smoker    Smokeless tobacco: Never Used   Substance and Sexual Activity    Alcohol use: Yes     Comment: occas.  beer or wine    Drug use: No   

## 2020-06-26 ENCOUNTER — TELEPHONE (OUTPATIENT)
Dept: ORTHOPEDIC SURGERY | Age: 50
End: 2020-06-26

## 2020-06-26 NOTE — TELEPHONE ENCOUNTER
[facility]MAZ CALLED. GETTING DENTAL WORK DONE. WILL CALL IN ANTIBIOTIC TO PETER OLIVO IN [place]. KELFEX 500 MG #20 - 4 TABLETS 1 HOUR PRIOR TO PROCEDURE.

## 2021-06-02 ENCOUNTER — OFFICE VISIT (OUTPATIENT)
Dept: ORTHOPEDIC SURGERY | Age: 51
End: 2021-06-02
Payer: COMMERCIAL

## 2021-06-02 VITALS — WEIGHT: 122 LBS | BODY MASS INDEX: 21.62 KG/M2 | HEIGHT: 63 IN

## 2021-06-02 DIAGNOSIS — S52.602A TRAUMATIC CLOSED DISPLACED FRACTURE OF DISTAL END OF LEFT RADIUS AND ULNA, INITIAL ENCOUNTER: Primary | ICD-10-CM

## 2021-06-02 DIAGNOSIS — S52.502A TRAUMATIC CLOSED DISPLACED FRACTURE OF DISTAL END OF LEFT RADIUS AND ULNA, INITIAL ENCOUNTER: Primary | ICD-10-CM

## 2021-06-02 PROCEDURE — 99213 OFFICE O/P EST LOW 20 MIN: CPT | Performed by: ORTHOPAEDIC SURGERY

## 2021-06-02 PROCEDURE — G8427 DOCREV CUR MEDS BY ELIG CLIN: HCPCS | Performed by: ORTHOPAEDIC SURGERY

## 2021-06-02 PROCEDURE — G8420 CALC BMI NORM PARAMETERS: HCPCS | Performed by: ORTHOPAEDIC SURGERY

## 2021-06-02 PROCEDURE — 1036F TOBACCO NON-USER: CPT | Performed by: ORTHOPAEDIC SURGERY

## 2021-06-02 PROCEDURE — 3017F COLORECTAL CA SCREEN DOC REV: CPT | Performed by: ORTHOPAEDIC SURGERY
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
## 2021-06-03 ENCOUNTER — HOSPITAL ENCOUNTER (OUTPATIENT)
Age: 51
Discharge: HOME | End: 2021-06-05
Payer: COMMERCIAL

## 2021-06-03 DIAGNOSIS — Z01.818 PREOP TESTING: ICD-10-CM

## 2021-06-03 PROCEDURE — U0003 INFECTIOUS AGENT DETECTION BY NUCLEIC ACID (DNA OR RNA); SEVERE ACUTE RESPIRATORY SYNDROME CORONAVIRUS 2 (SARS-COV-2) (CORONAVIRUS DISEASE [COVID-19]), AMPLIFIED PROBE TECHNIQUE, MAKING USE OF HIGH THROUGHPUT TECHNOLOGIES AS DESCRIBED BY CMS-2020-01-R: HCPCS

## 2021-06-03 PROCEDURE — U0005 INFEC AGEN DETEC AMPLI PROBE: HCPCS

## 2021-06-05 LAB — SARS-COV-2, PCR: NOT DETECTED

## 2021-06-07 NOTE — PROGRESS NOTES
[facility]                                                                                                                    PRE OP INSTRUCTIONS FOR  Nel Briscoe        Date: 6/7/2021    Date of surgery: 6/8/2021    Arrival Time: Hospital will call you between 5pm and 7pm with your final arrival time for surgery    1. Do not eat or drink anything after  Midnight  prior to surgery. This includes no water, chewing gum, mints or ice chips. 2. Take the following medications with a small sip of water on the morning of Surgery:     3. Diabetics may take evening dose of insulin but none after midnight. If you feel symptomatic or low blood sugar morning of surgery drink 1-2 ounces of apple juice only. 4. Aspirin, Ibuprofen, Advil, Naproxen, Vitamin E and other Anti-inflammatory products should be stopped  before surgery  as directed by your physician. Take Tylenol only unless instructed otherwise by your surgeon. 5. Check with your Doctor regarding stopping Plavix, Coumadin, Lovenox, Eliquis, Effient, or other blood thinners. 6. Do not smoke,use illicit drugs and do not drink any alcoholic beverages 24 hours prior to surgery. 7. You may brush your teeth the morning of surgery. DO NOT SWALLOW WATER    8. You MUST make arrangements for a responsible adult to take you home after your surgery. You will not be allowed to leave alone or drive yourself home. It is strongly suggested someone stay with you the first 24 hrs. Your surgery will be cancelled if you do not have a ride home. 9. PEDIATRIC PATIENTS ONLY:  A parent/legal guardian must accompany a child scheduled for surgery and plan to stay at the hospital until the child is discharged. Please do not bring other children with you.     10. Please wear simple, loose fitting clothing to the hospital.  Faith Hartley not bring valuables (money, credit cards, checkbooks, etc.) Do not wear any makeup (including no eye makeup) or nail polish on your fingers or toes. 11. DO NOT wear any jewelry or piercings on day of surgery. All body piercing jewelry must be removed. 12. Shower the night before surgery with _x__Antibacterial soap /FLOWER WIPES________    13. TOTAL JOINT REPLACEMENT/HYSTERECTOMY PATIENTS ONLY---Remember to bring Blood Bank bracelet to the hospital on the day of surgery. 14. If you have a Living Will and Durable Power of  for Healthcare, please bring in a copy. 15. If appropriate bring crutches, inspirex, WALKER, CANE etc... 12. Notify your Surgeon if you develop any illness between now and surgery time, cough, cold, fever, sore throat, nausea, vomiting, etc.  Please notify your surgeon if you experience dizziness, shortness of breath or blurred vision between now & the time of your surgery. 17. If you have ___dentures, they will be removed before going to the OR; we will provide you a container. If you wear ___contact lenses or ___glasses, they will be removed; please bring a case for them. 18. To provide excellent care visitors will be limited to 2 in the room at any given time. 19. Please bring picture ID and insurance card. 20. Sleep apnea patients need to bring CPAP AND SETTINGS to hospital on day of surgery. 21. During flu season no children under the age of 15 are permitted in the hospital for the safety of all patients. 22. Other                  Please call AMBULATORY CARE if you have any further questions.    [facility]     [address]

## 2021-06-08 ENCOUNTER — ANESTHESIA (OUTPATIENT)
Dept: OPERATING ROOM | Age: 51
End: 2021-06-08
Payer: COMMERCIAL

## 2021-06-08 ENCOUNTER — ANESTHESIA EVENT (OUTPATIENT)
Dept: OPERATING ROOM | Age: 51
End: 2021-06-08
Payer: COMMERCIAL

## 2021-06-08 ENCOUNTER — HOSPITAL ENCOUNTER (OUTPATIENT)
Dept: GENERAL RADIOLOGY | Age: 51
Discharge: HOME | End: 2021-06-10
Payer: COMMERCIAL

## 2021-06-08 ENCOUNTER — HOSPITAL ENCOUNTER (OUTPATIENT)
Age: 51
Setting detail: OUTPATIENT SURGERY
Discharge: HOME | End: 2021-06-08
Attending: ORTHOPAEDIC SURGERY | Admitting: ORTHOPAEDIC SURGERY
Payer: COMMERCIAL

## 2021-06-08 VITALS
WEIGHT: 126.1 LBS | HEART RATE: 50 BPM | TEMPERATURE: 97.1 F | SYSTOLIC BLOOD PRESSURE: 112 MMHG | HEIGHT: 63 IN | BODY MASS INDEX: 22.34 KG/M2 | RESPIRATION RATE: 16 BRPM | DIASTOLIC BLOOD PRESSURE: 57 MMHG | OXYGEN SATURATION: 98 %

## 2021-06-08 VITALS
TEMPERATURE: 94.8 F | RESPIRATION RATE: 4 BRPM | DIASTOLIC BLOOD PRESSURE: 76 MMHG | SYSTOLIC BLOOD PRESSURE: 138 MMHG | OXYGEN SATURATION: 98 %

## 2021-06-08 DIAGNOSIS — S52.502A CLOSED TRAUMATIC DISPLACED FRACTURE OF DISTAL END OF LEFT RADIUS, INITIAL ENCOUNTER: ICD-10-CM

## 2021-06-08 DIAGNOSIS — S62.102B: ICD-10-CM

## 2021-06-08 DIAGNOSIS — Z01.818 PREOP TESTING: Primary | ICD-10-CM

## 2021-06-08 LAB — HCG(URINE) PREGNANCY TEST: NEGATIVE

## 2021-06-08 PROCEDURE — 7100000000 HC PACU RECOVERY - FIRST 15 MIN: Performed by: ORTHOPAEDIC SURGERY

## 2021-06-08 PROCEDURE — 2709999900 HC NON-CHARGEABLE SUPPLY: Performed by: ORTHOPAEDIC SURGERY

## 2021-06-08 PROCEDURE — 3700000000 HC ANESTHESIA ATTENDED CARE: Performed by: ORTHOPAEDIC SURGERY

## 2021-06-08 PROCEDURE — 6360000002 HC RX W HCPCS

## 2021-06-08 PROCEDURE — C1713 ANCHOR/SCREW BN/BN,TIS/BN: HCPCS | Performed by: ORTHOPAEDIC SURGERY

## 2021-06-08 PROCEDURE — 2580000003 HC RX 258: Performed by: ORTHOPAEDIC SURGERY

## 2021-06-08 PROCEDURE — 7100000011 HC PHASE II RECOVERY - ADDTL 15 MIN: Performed by: ORTHOPAEDIC SURGERY

## 2021-06-08 PROCEDURE — 2580000003 HC RX 258: Performed by: NURSE ANESTHETIST, CERTIFIED REGISTERED

## 2021-06-08 PROCEDURE — 7100000010 HC PHASE II RECOVERY - FIRST 15 MIN: Performed by: ORTHOPAEDIC SURGERY

## 2021-06-08 PROCEDURE — 2500000003 HC RX 250 WO HCPCS: Performed by: NURSE ANESTHETIST, CERTIFIED REGISTERED

## 2021-06-08 PROCEDURE — 2580000003 HC RX 258: Performed by: ANESTHESIOLOGY

## 2021-06-08 PROCEDURE — 3209999900 FLUORO FOR SURGICAL PROCEDURES

## 2021-06-08 PROCEDURE — 2720000010 HC SURG SUPPLY STERILE: Performed by: ORTHOPAEDIC SURGERY

## 2021-06-08 PROCEDURE — 6360000002 HC RX W HCPCS: Performed by: ORTHOPAEDIC SURGERY

## 2021-06-08 PROCEDURE — 3600000003 HC SURGERY LEVEL 3 BASE: Performed by: ORTHOPAEDIC SURGERY

## 2021-06-08 PROCEDURE — 6360000002 HC RX W HCPCS: Performed by: ANESTHESIOLOGY

## 2021-06-08 PROCEDURE — 25609 OPTX DST RD XART FX/EP SEP3+: CPT | Performed by: ORTHOPAEDIC SURGERY

## 2021-06-08 PROCEDURE — 81025 URINE PREGNANCY TEST: CPT

## 2021-06-08 PROCEDURE — 3700000001 HC ADD 15 MINUTES (ANESTHESIA): Performed by: ORTHOPAEDIC SURGERY

## 2021-06-08 PROCEDURE — 6360000002 HC RX W HCPCS: Performed by: NURSE ANESTHETIST, CERTIFIED REGISTERED

## 2021-06-08 PROCEDURE — 64415 NJX AA&/STRD BRCH PLXS IMG: CPT | Performed by: ANESTHESIOLOGY

## 2021-06-08 PROCEDURE — 3600000013 HC SURGERY LEVEL 3 ADDTL 15MIN: Performed by: ORTHOPAEDIC SURGERY

## 2021-06-08 PROCEDURE — 7100000001 HC PACU RECOVERY - ADDTL 15 MIN: Performed by: ORTHOPAEDIC SURGERY

## 2021-06-08 DEVICE — SCREW BNE L12MM DIA2.4MM DST RAD VOLAR S STL ST VAR ANG LOK: Type: IMPLANTABLE DEVICE | Site: ARM | Status: FUNCTIONAL

## 2021-06-08 DEVICE — SCREW BNE L14MM DIA2.7MM CORT S STL ST T8 STARDRV RECESS: Type: IMPLANTABLE DEVICE | Status: FUNCTIONAL

## 2021-06-08 DEVICE — PLATE BNE W22XL54MM STD 9 H L DST VOLAR RAD S STL TWO CLMN: Type: IMPLANTABLE DEVICE | Site: ARM | Status: FUNCTIONAL

## 2021-06-08 DEVICE — SCREW BNE L16MM DIA2.4MM DST RAD VOLAR S STL ST VAR ANG LOK: Type: IMPLANTABLE DEVICE | Site: ARM | Status: FUNCTIONAL

## 2021-06-08 DEVICE — SCREW BNE L18MM DIA2.4MM DST RAD VOLAR S STL ST VAR ANG LOK: Type: IMPLANTABLE DEVICE | Site: ARM | Status: FUNCTIONAL

## 2021-06-08 DEVICE — SCREW BNE L8MM DIA2.4MM S STL ST VAR ANG LOK FULL THRD T8: Type: IMPLANTABLE DEVICE | Site: ARM | Status: FUNCTIONAL

## 2021-06-08 DEVICE — SCREW BNE L12MM DIA2.7MM CORT S STL ST T8 STARDRV RECESS: Type: IMPLANTABLE DEVICE | Site: ARM | Status: FUNCTIONAL

## 2021-06-08 RX ORDER — ROPIVACAINE HYDROCHLORIDE 5 MG/ML
INJECTION, SOLUTION EPIDURAL; INFILTRATION; PERINEURAL
Status: COMPLETED | OUTPATIENT
Start: 2021-06-08 | End: 2021-06-08

## 2021-06-08 RX ORDER — FENTANYL CITRATE 50 UG/ML
25 INJECTION, SOLUTION INTRAMUSCULAR; INTRAVENOUS EVERY 5 MIN PRN
Status: DISCONTINUED | OUTPATIENT
Start: 2021-06-08 | End: 2021-06-08 | Stop reason: HOSPADM

## 2021-06-08 RX ORDER — FENTANYL CITRATE 50 UG/ML
INJECTION, SOLUTION INTRAMUSCULAR; INTRAVENOUS PRN
Status: DISCONTINUED | OUTPATIENT
Start: 2021-06-08 | End: 2021-06-08 | Stop reason: SDUPTHER

## 2021-06-08 RX ORDER — MIDAZOLAM HYDROCHLORIDE 1 MG/ML
1 INJECTION INTRAMUSCULAR; INTRAVENOUS EVERY 5 MIN PRN
Status: COMPLETED | OUTPATIENT
Start: 2021-06-08 | End: 2021-06-08

## 2021-06-08 RX ORDER — HYDROCODONE BITARTRATE AND ACETAMINOPHEN 5; 325 MG/1; MG/1
1 TABLET ORAL PRN
Status: DISCONTINUED | OUTPATIENT
Start: 2021-06-08 | End: 2021-06-08 | Stop reason: HOSPADM

## 2021-06-08 RX ORDER — DEXAMETHASONE SODIUM PHOSPHATE 10 MG/ML
INJECTION, SOLUTION INTRAMUSCULAR; INTRAVENOUS PRN
Status: DISCONTINUED | OUTPATIENT
Start: 2021-06-08 | End: 2021-06-08 | Stop reason: SDUPTHER

## 2021-06-08 RX ORDER — HYDROCODONE BITARTRATE AND ACETAMINOPHEN 5; 325 MG/1; MG/1
2 TABLET ORAL PRN
Status: DISCONTINUED | OUTPATIENT
Start: 2021-06-08 | End: 2021-06-08 | Stop reason: HOSPADM

## 2021-06-08 RX ORDER — GLYCOPYRROLATE 1 MG/5 ML
SYRINGE (ML) INTRAVENOUS PRN
Status: DISCONTINUED | OUTPATIENT
Start: 2021-06-08 | End: 2021-06-08 | Stop reason: SDUPTHER

## 2021-06-08 RX ORDER — NEOSTIGMINE METHYLSULFATE 1 MG/ML
INJECTION, SOLUTION INTRAVENOUS PRN
Status: DISCONTINUED | OUTPATIENT
Start: 2021-06-08 | End: 2021-06-08 | Stop reason: SDUPTHER

## 2021-06-08 RX ORDER — SODIUM CHLORIDE 0.9 % (FLUSH) 0.9 %
5-40 SYRINGE (ML) INJECTION PRN
Status: DISCONTINUED | OUTPATIENT
Start: 2021-06-08 | End: 2021-06-08 | Stop reason: HOSPADM

## 2021-06-08 RX ORDER — SODIUM CHLORIDE, SODIUM LACTATE, POTASSIUM CHLORIDE, CALCIUM CHLORIDE 600; 310; 30; 20 MG/100ML; MG/100ML; MG/100ML; MG/100ML
INJECTION, SOLUTION INTRAVENOUS CONTINUOUS
Status: DISCONTINUED | OUTPATIENT
Start: 2021-06-08 | End: 2021-06-08 | Stop reason: HOSPADM

## 2021-06-08 RX ORDER — MIDAZOLAM HYDROCHLORIDE 1 MG/ML
INJECTION INTRAMUSCULAR; INTRAVENOUS
Status: COMPLETED
Start: 2021-06-08 | End: 2021-06-08

## 2021-06-08 RX ORDER — SODIUM CHLORIDE, SODIUM LACTATE, POTASSIUM CHLORIDE, CALCIUM CHLORIDE 600; 310; 30; 20 MG/100ML; MG/100ML; MG/100ML; MG/100ML
INJECTION, SOLUTION INTRAVENOUS CONTINUOUS PRN
Status: DISCONTINUED | OUTPATIENT
Start: 2021-06-08 | End: 2021-06-08 | Stop reason: SDUPTHER

## 2021-06-08 RX ORDER — MORPHINE SULFATE 2 MG/ML
2 INJECTION, SOLUTION INTRAMUSCULAR; INTRAVENOUS EVERY 5 MIN PRN
Status: DISCONTINUED | OUTPATIENT
Start: 2021-06-08 | End: 2021-06-08 | Stop reason: HOSPADM

## 2021-06-08 RX ORDER — SCOLOPAMINE TRANSDERMAL SYSTEM 1 MG/1
PATCH, EXTENDED RELEASE TRANSDERMAL
Status: DISCONTINUED
Start: 2021-06-08 | End: 2021-06-08 | Stop reason: HOSPADM

## 2021-06-08 RX ORDER — PROMETHAZINE HYDROCHLORIDE 25 MG/ML
6.25 INJECTION, SOLUTION INTRAMUSCULAR; INTRAVENOUS
Status: DISCONTINUED | OUTPATIENT
Start: 2021-06-08 | End: 2021-06-08 | Stop reason: HOSPADM

## 2021-06-08 RX ORDER — LIDOCAINE HYDROCHLORIDE 20 MG/ML
INJECTION, SOLUTION INTRAVENOUS PRN
Status: DISCONTINUED | OUTPATIENT
Start: 2021-06-08 | End: 2021-06-08 | Stop reason: SDUPTHER

## 2021-06-08 RX ORDER — ROPIVACAINE HYDROCHLORIDE 5 MG/ML
30 INJECTION, SOLUTION EPIDURAL; INFILTRATION; PERINEURAL ONCE
Status: COMPLETED | OUTPATIENT
Start: 2021-06-08 | End: 2021-06-08

## 2021-06-08 RX ORDER — ROCURONIUM BROMIDE 10 MG/ML
INJECTION, SOLUTION INTRAVENOUS PRN
Status: DISCONTINUED | OUTPATIENT
Start: 2021-06-08 | End: 2021-06-08 | Stop reason: SDUPTHER

## 2021-06-08 RX ORDER — MEPERIDINE HYDROCHLORIDE 25 MG/ML
12.5 INJECTION INTRAMUSCULAR; INTRAVENOUS; SUBCUTANEOUS EVERY 5 MIN PRN
Status: DISCONTINUED | OUTPATIENT
Start: 2021-06-08 | End: 2021-06-08 | Stop reason: HOSPADM

## 2021-06-08 RX ORDER — PROPOFOL 10 MG/ML
INJECTION, EMULSION INTRAVENOUS PRN
Status: DISCONTINUED | OUTPATIENT
Start: 2021-06-08 | End: 2021-06-08 | Stop reason: SDUPTHER

## 2021-06-08 RX ORDER — ROPIVACAINE HYDROCHLORIDE 5 MG/ML
INJECTION, SOLUTION EPIDURAL; INFILTRATION; PERINEURAL
Status: COMPLETED
Start: 2021-06-08 | End: 2021-06-08

## 2021-06-08 RX ORDER — FENTANYL CITRATE 50 UG/ML
50 INJECTION, SOLUTION INTRAMUSCULAR; INTRAVENOUS EVERY 5 MIN PRN
Status: COMPLETED | OUTPATIENT
Start: 2021-06-08 | End: 2021-06-08

## 2021-06-08 RX ORDER — FENTANYL CITRATE 50 UG/ML
INJECTION, SOLUTION INTRAMUSCULAR; INTRAVENOUS
Status: COMPLETED
Start: 2021-06-08 | End: 2021-06-08

## 2021-06-08 RX ORDER — HYDROCODONE BITARTRATE AND ACETAMINOPHEN 5; 325 MG/1; MG/1
1 TABLET ORAL EVERY 4 HOURS PRN
Qty: 40 TABLET | Refills: 0 | Status: SHIPPED | OUTPATIENT
Start: 2021-06-08 | End: 2021-06-15

## 2021-06-08 RX ORDER — ONDANSETRON 2 MG/ML
INJECTION INTRAMUSCULAR; INTRAVENOUS PRN
Status: DISCONTINUED | OUTPATIENT
Start: 2021-06-08 | End: 2021-06-08 | Stop reason: SDUPTHER

## 2021-06-08 RX ADMIN — FENTANYL CITRATE 50 MCG: 50 INJECTION, SOLUTION INTRAMUSCULAR; INTRAVENOUS at 08:52

## 2021-06-08 RX ADMIN — MIDAZOLAM 1 MG: 1 INJECTION INTRAMUSCULAR; INTRAVENOUS at 08:46

## 2021-06-08 RX ADMIN — ONDANSETRON 4 MG: 2 INJECTION INTRAMUSCULAR; INTRAVENOUS at 09:43

## 2021-06-08 RX ADMIN — FENTANYL CITRATE 50 MCG: 50 INJECTION, SOLUTION INTRAMUSCULAR; INTRAVENOUS at 11:16

## 2021-06-08 RX ADMIN — ROPIVACAINE HYDROCHLORIDE 30 ML: 5 INJECTION, SOLUTION EPIDURAL; INFILTRATION; PERINEURAL at 09:00

## 2021-06-08 RX ADMIN — PROPOFOL 150 MG: 10 INJECTION, EMULSION INTRAVENOUS at 09:17

## 2021-06-08 RX ADMIN — LIDOCAINE HYDROCHLORIDE 40 MG: 20 INJECTION, SOLUTION INTRAVENOUS at 09:17

## 2021-06-08 RX ADMIN — FENTANYL CITRATE 50 MCG: 50 INJECTION, SOLUTION INTRAMUSCULAR; INTRAVENOUS at 08:46

## 2021-06-08 RX ADMIN — ROCURONIUM BROMIDE 10 MG: 10 SOLUTION INTRAVENOUS at 09:40

## 2021-06-08 RX ADMIN — ROPIVACAINE HYDROCHLORIDE 30 ML: 5 INJECTION, SOLUTION EPIDURAL; INFILTRATION; PERINEURAL at 09:02

## 2021-06-08 RX ADMIN — Medication 3 MG: at 10:43

## 2021-06-08 RX ADMIN — FENTANYL CITRATE 50 MCG: 50 INJECTION, SOLUTION INTRAMUSCULAR; INTRAVENOUS at 10:30

## 2021-06-08 RX ADMIN — MIDAZOLAM 1 MG: 1 INJECTION INTRAMUSCULAR; INTRAVENOUS at 08:48

## 2021-06-08 RX ADMIN — ROCURONIUM BROMIDE 30 MG: 10 SOLUTION INTRAVENOUS at 09:17

## 2021-06-08 RX ADMIN — DEXAMETHASONE SODIUM PHOSPHATE 10 MG: 10 INJECTION, SOLUTION INTRAMUSCULAR; INTRAVENOUS at 09:35

## 2021-06-08 RX ADMIN — CEFAZOLIN 1000 MG: 1 INJECTION, POWDER, FOR SOLUTION INTRAMUSCULAR; INTRAVENOUS at 09:12

## 2021-06-08 RX ADMIN — SODIUM CHLORIDE, POTASSIUM CHLORIDE, SODIUM LACTATE AND CALCIUM CHLORIDE: 600; 310; 30; 20 INJECTION, SOLUTION INTRAVENOUS at 09:12

## 2021-06-08 RX ADMIN — Medication 0.6 MG: at 10:43

## 2021-06-08 RX ADMIN — SODIUM CHLORIDE, POTASSIUM CHLORIDE, SODIUM LACTATE AND CALCIUM CHLORIDE: 600; 310; 30; 20 INJECTION, SOLUTION INTRAVENOUS at 08:07

## 2021-06-08 ASSESSMENT — PULMONARY FUNCTION TESTS
PIF_VALUE: 21
PIF_VALUE: 19
PIF_VALUE: 13
PIF_VALUE: 19
PIF_VALUE: 20
PIF_VALUE: 19
PIF_VALUE: 20
PIF_VALUE: 19
PIF_VALUE: 19
PIF_VALUE: 18
PIF_VALUE: 18
PIF_VALUE: 19
PIF_VALUE: 1
PIF_VALUE: 18
PIF_VALUE: 1
PIF_VALUE: 19
PIF_VALUE: 18
PIF_VALUE: 19
PIF_VALUE: 19
PIF_VALUE: 2
PIF_VALUE: 2
PIF_VALUE: 20
PIF_VALUE: 16
PIF_VALUE: 19
PIF_VALUE: 2
PIF_VALUE: 19
PIF_VALUE: 20
PIF_VALUE: 18
PIF_VALUE: 1
PIF_VALUE: 20
PIF_VALUE: 20
PIF_VALUE: 21
PIF_VALUE: 3
PIF_VALUE: 19
PIF_VALUE: 12
PIF_VALUE: 19
PIF_VALUE: 3
PIF_VALUE: 19
PIF_VALUE: 22
PIF_VALUE: 19
PIF_VALUE: 20
PIF_VALUE: 18
PIF_VALUE: 19
PIF_VALUE: 18
PIF_VALUE: 20
PIF_VALUE: 18
PIF_VALUE: 18
PIF_VALUE: 19
PIF_VALUE: 20
PIF_VALUE: 19
PIF_VALUE: 19
PIF_VALUE: 1
PIF_VALUE: 18
PIF_VALUE: 19
PIF_VALUE: 19
PIF_VALUE: 12
PIF_VALUE: 13
PIF_VALUE: 19
PIF_VALUE: 20
PIF_VALUE: 14
PIF_VALUE: 19
PIF_VALUE: 13
PIF_VALUE: 20
PIF_VALUE: 20
PIF_VALUE: 1
PIF_VALUE: 19
PIF_VALUE: 12
PIF_VALUE: 19
PIF_VALUE: 19
PIF_VALUE: 10
PIF_VALUE: 19
PIF_VALUE: 1
PIF_VALUE: 19
PIF_VALUE: 20
PIF_VALUE: 19
PIF_VALUE: 1
PIF_VALUE: 18
PIF_VALUE: 19
PIF_VALUE: 2
PIF_VALUE: 20
PIF_VALUE: 19
PIF_VALUE: 20
PIF_VALUE: 19
PIF_VALUE: 20
PIF_VALUE: 11
PIF_VALUE: 19
PIF_VALUE: 3
PIF_VALUE: 19
PIF_VALUE: 19
PIF_VALUE: 4
PIF_VALUE: 18
PIF_VALUE: 19
PIF_VALUE: 19

## 2021-06-08 ASSESSMENT — PAIN - FUNCTIONAL ASSESSMENT: PAIN_FUNCTIONAL_ASSESSMENT: 0-10

## 2021-06-08 ASSESSMENT — PAIN SCALES - GENERAL
PAINLEVEL_OUTOF10: 0

## 2021-06-08 ASSESSMENT — LIFESTYLE VARIABLES: SMOKING_STATUS: 0
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
## 2021-06-08 NOTE — ANESTHESIA PRE PROCEDURE
(1.6 m) 5' 3\" (1.6 m)                                              BP Readings from Last 3 Encounters:   06/08/21 125/68   05/08/19 (!) 122/57   05/07/19 (!) 102/51       NPO Status: Time of last liquid consumption: 2000                        Time of last solid consumption: 2000                        Date of last liquid consumption: 06/07/21                        Date of last solid food consumption: 06/07/21    BMI:   Wt Readings from Last 3 Encounters:   06/08/21 126 lb 1.6 oz (57.2 kg)   06/02/21 122 lb (55.3 kg)   06/03/20 122 lb (55.3 kg)     Body mass index is 22.34 kg/m². CBC:   Lab Results   Component Value Date    WBC 5.8 04/29/2019    RBC 4.42 04/29/2019    HGB 10.4 05/08/2019    HCT 31.8 05/08/2019    MCV 96.2 04/29/2019    RDW 12.9 04/29/2019     04/29/2019       CMP:   Lab Results   Component Value Date     04/29/2019    K 4.0 04/29/2019     04/29/2019    CO2 28 04/29/2019    BUN 10 04/29/2019    CREATININE 0.7 04/29/2019    GFRAA >60 04/29/2019    LABGLOM >60 04/29/2019    GLUCOSE 96 04/29/2019    PROT 6.3 09/17/2017    CALCIUM 9.5 04/29/2019    BILITOT 0.3 09/17/2017    ALKPHOS 62 09/17/2017    AST 21 09/17/2017    ALT 12 09/17/2017       POC Tests: No results for input(s): POCGLU, POCNA, POCK, POCCL, POCBUN, POCHEMO, POCHCT in the last 72 hours. Coags:   Lab Results   Component Value Date    PROTIME 11.4 04/29/2019    INR 1.0 04/29/2019    APTT 31.7 04/29/2019       HCG (If Applicable):   Lab Results   Component Value Date    PREGTESTUR NEGATIVE 06/08/2021        ABGs: No results found for: PHART, PO2ART, ZWA4LHU, QDY5IMJ, BEART, R2PJBGNA     Type & Screen (If Applicable):  No results found for: [facility]    Anesthesia Evaluation  Patient summary reviewed   history of anesthetic complications: PONV.   Airway: Mallampati: II  TM distance: >3 FB   Neck ROM: full  Mouth opening: > = 3 FB Dental:          Pulmonary:normal exam  breath sounds clear to auscultation      (-) not a current smoker                           Cardiovascular:Negative CV ROS          ECG reviewed  Rhythm: regular  Rate: normal                 ROS comment: EKG: Normal Sinus Rhythm 100. Neuro/Psych:   Negative Neuro/Psych ROS              GI/Hepatic/Renal: Neg GI/Hepatic/Renal ROS           ROS comment: Rectal Fissure. .   Endo/Other:    (+) : arthritis: OA., .                  ROS comment: Left radius/ulna fracture Abdominal:         (-) obese     Vascular:           ROS comment: Varicose veins Left LL. Gail Isidro Anesthesia Plan      general     ASA 2     (Left axillary block for post-op pain control  )  Induction: intravenous. MIPS: Postoperative opioids intended and Prophylactic antiemetics administered. Anesthetic plan and risks discussed with patient. Plan discussed with CRNA. DOS STAFF ADDENDUM:    Pt seen and examined, chart reviewed (including anesthesia, drug and allergy history). Anesthetic plan, risks, benefits, alternatives, and personnel involved discussed with patient. Patient verbalized an understanding and agrees to proceed. Plan discussed with care team members and agreed upon.     Leila Dumont MD  Staff Anesthesiologist  8:36 AM    Leila Dumont MD   6/8/2021

## 2021-06-08 NOTE — OP NOTE
Operative report        DATE OF PROCEDURE: 6/8/2021     SURGEON: Estrella Delgado    ASSISTANT: Kar Harris    PREOPERATIVE DIAGNOSIS: Comminuted intra-articular displaced fracture left distal radius    POSTOPERATIVE DIAGNOSIS: Same    OPERATION: Open reduction internal plate screw fixation left distal radius    ANESTHESIA: General                    axillary nerve block    ESTIMATED BLOOD LOSS: Scant    COMPLICATIONS: None    SPECIMENS: Was sent to pathology    HISTORY: The patient is a 46y.o. year old female with history of above preop diagnosis. I explained the risk, benefits, expected outcome, and alternatives to the procedure. Patient understands and is in agreement. PROCEDURE: The patient is brought the operating room after signs are identified. Axillary block of been done by anesthesia in the holding area. Patient was placed supine on operating table and general anesthetic was administered. The left upper extremity was then prepped and draped sterile fashion with a tourniquet high on the left upper arm. Incision was marked along the flexor carpi radialis tendon proximal to the wrist flexion crease. The arm was then exsanguinated with elastic wrap tourniquet pressure was set at 250 mmHg pressure. The incision was made. Deeper dissection was done bluntly using the FCR side identifying landmark. This was taken adjacent to this and the nerve was retracted to the ulnar side along with the flexor tendons. This exposed the pronator quadratus which was then elevated from the radial side of the radius exposing the fracture in the distal shaft and metaphysis of the bone. The C-arm was used to guide reduction of the fracture in multiple planes. K wires were used from radial styloid obliquely across the fracture engaging the proximal fragment to help maintain the reduction which was anatomical on the radial side.   There is still little bit of dorsiflexion at the fracture and when a plate was applied this was taken into consideration using a kickstand screw. The plate was then positioned and then pinned to the bone and confirmed for position with the C arm. A distal nonlocking screw was used to lag the plate to the bone and help maintain reduction. Distal row screws were then placed in locking fashion to support the distal radial fragment across the width and catch the lunate fossa fragment on the ulnar side and the styloid on the radial side. The kickstand screw was then removed and the pin to allow the plate to be clamped down to the radius. A single cortical screw was placed in this to maintain the reduction. Alignment and position was confirmed with the C arm once again. The additional 2 screws in the 3 hole plate were placed in the proximal fragment and cortical fashion maintaining excellent stability. Final films were taken in AP and lateral and also flexed position tangent to ensure screw containment. All looked good. The wound was then thoroughly irrigated and closed in a layered fashion with absorbable suture with Monocryl 4-0 intradermal and Steri-Strips on the skin. Xeroform gauze was placed in a bulky dressing and a short arm volar splint with the wrist in neutral position allowing good finger motion. The tourniquet was deflated good circulation turned to the upper extremity. The patient's anesthetic was reversed and she was taken to recovery in stable condition    GROSS PATHOLOGY: Comminuted displaced intra-articular fracture left distal radius    COMPONENTS USED : Synthes volar distal radius plate 3 hole left standard with multiple distal locking screws and 3 proximal cortical screws. All reasonable efforts have been made to minimize the risk of errors that may occur in the use of voice recognition and other electronic means of charting.       Electronically signed by Rowena Delgado DO on 6/8/21 at 10:46 AM EDT

## 2021-06-08 NOTE — PROGRESS NOTES
840 to pacu for left axillary block. Connected to all monitors and O2 applied at 3 liters nasal canula  845 time out performed and premedicated per orders  852 block started utilizing ultrsound and nerve stimulator by dr Dyllan Sales. 902 30cc  0.5% ropivacaine injected to left axilla .  alessandra procedure well

## 2021-06-08 NOTE — ANESTHESIA POSTPROCEDURE EVALUATION
Department of Anesthesiology  Postprocedure Note    Patient: Lucretia Augustin  MRN: [id number]  YOB: 1970  Date of evaluation: 6/8/2021  Time:  1:24 PM     Procedure Summary     Date: 06/08/21 Room / Location: [address] [place] [address] / [address] [place] [address]    Anesthesia Start: 5485 Anesthesia Stop: 9485    Procedure: LEFT RADIUS  OPEN REDUCTION INTERNAL FIXATION (Left Arm Lower) Diagnosis: (TRAUMATIC CLOSED DISPLACED FRACTURE DISTAL END RADIUS AND ULNA)    Surgeons: Lorri Arizmendi DO Responsible Provider: Michelle Montes MD    Anesthesia Type: general ASA Status: 2          Anesthesia Type: general    Bianca Phase I: Bianca Score: 9    Bianca Phase II:      Last vitals: Reviewed and per EMR flowsheets.        Anesthesia Post Evaluation    Patient location during evaluation: PACU  Patient participation: complete - patient participated  Level of consciousness: awake  Airway patency: patent  Nausea & Vomiting: no nausea and no vomiting  Complications: no  Cardiovascular status: hemodynamically stable  Respiratory status: acceptable  Hydration status: euvolemic  Comments: Very satisfied with results of nerve block on pain control

## 2021-06-17 NOTE — PROGRESS NOTES
Pt Rx for norco left here. Called pt today. She states she never needed or wanted it.   Rx destroyed and placed in [facility], witnessed by Rayray Torres RN  6/17/2021  10:43 AM

## 2021-06-18 ENCOUNTER — OFFICE VISIT (OUTPATIENT)
Dept: ORTHOPEDIC SURGERY | Age: 51
End: 2021-06-18

## 2021-06-18 VITALS — TEMPERATURE: 98 F | WEIGHT: 126 LBS | HEIGHT: 63 IN | BODY MASS INDEX: 22.32 KG/M2

## 2021-06-18 DIAGNOSIS — S52.502A TRAUMATIC CLOSED DISPLACED FRACTURE OF DISTAL END OF LEFT RADIUS AND ULNA, INITIAL ENCOUNTER: Primary | ICD-10-CM

## 2021-06-18 DIAGNOSIS — S52.602A TRAUMATIC CLOSED DISPLACED FRACTURE OF DISTAL END OF LEFT RADIUS AND ULNA, INITIAL ENCOUNTER: Primary | ICD-10-CM

## 2021-06-18 PROCEDURE — 99024 POSTOP FOLLOW-UP VISIT: CPT | Performed by: ORTHOPAEDIC SURGERY

## 2021-06-18 NOTE — PROGRESS NOTES
Chief Complaint:   Chief Complaint   Patient presents with    Wrist Pain     Left wrist ORIF DOS 06/08/2021       Sherin Sunshine is 10 days postop ORIF left distal radius. She is doing very well. She has very little pain. She is working on her fingers and the motion is coming along nicely. Allergies; medications; past medical, surgical, family, and social history; and problem list have been reviewed today and updated as indicated in this encounter seen below. Exam: The wound looks good. Steri-Strips are in place. There is no drainage. She has pretty good finger range of motion already with little limitation in the MP joints. Radiographs: Reviewed the intraoperative imaging to assure the fixation alignment. ASSESSMENT:    Leslye Peña was seen today for wrist pain. Diagnoses and all orders for this visit:    Traumatic closed displaced fracture of distal end of left radius and ulna, initial encounter        PLAN: A Velcro closure brace was placed. She will use this most times. In about 3 days she may start soaking her hand and squeezing sponge for exercise. We will follow-up in about 3 weeks. Return in about 3 weeks (around 7/9/2021). Current Outpatient Medications   Medication Sig Dispense Refill    NONFORMULARY Multiple supplements fish oil stopped 1 week ago multiple vitamin and probiotic stopped Friday    Also on devils claw turmeric toke berry trevor       No current facility-administered medications for this visit.        Patient Active Problem List   Diagnosis    Shoulder pain    Shoulder contusion    Shoulder sprain    Arthritis of left hip    Trauma    Fracture of right tibia    Fracture of right fibula    Degenerative arthritis of hip    Traumatic closed displaced fracture of distal end of left radius       Past Medical History:   Diagnosis Date    Arthritis     left hip osteo arthritis    Fractures     right tibia fibula    Nosebleed 2011    history of bad bleed; one time only    PONV (postoperative nausea and vomiting)     sodium pentathol    PVC (premature ventricular contraction)     history of     Rectal fissure     Varicose veins of left lower extremity        Past Surgical History:   Procedure Laterality Date    CYST REMOVAL  2007    necrotized tissue; encapsulated; removed groin    FOREARM SURGERY Left 6/8/2021    LEFT RADIUS  OPEN REDUCTION INTERNAL FIXATION performed by [place] Leventhal, DO at [address]  10/2017    tibia, fibula right leg    KNEE SURGERY  1983    lateral release    OTHER SURGICAL HISTORY Right 09/26/2017    ORIF right lower tibia, fibula    TOTAL HIP ARTHROPLASTY Left 5/7/2019    LEFT HIP TOTAL ARTHROPLASTY WITH CELL SAVER (DEPUY) performed by Kemp Leventhal, DO at [address] EXTRACTION         Allergies   Allergen Reactions    Vicodin [Hydrocodone-Acetaminophen] Nausea And Vomiting    Naproxen Other (See Comments)     Rectal bleeding, fever    Sulfa Antibiotics Hives and Itching    Codeine Nausea And Vomiting       Social History     Socioeconomic History    Marital status:      Spouse name: None    Number of children: None    Years of education: None    Highest education level: None   Occupational History    None   Tobacco Use    Smoking status: Never Smoker    Smokeless tobacco: Never Used   Vaping Use    Vaping Use: Never used   Substance and Sexual Activity    Alcohol use: Yes     Comment: occas. beer or wine    Drug use: No    Sexual activity: None   Other Topics Concern    None   Social History Narrative    None     Social Determinants of Health     Financial Resource Strain:     Difficulty of Paying Living Expenses:    Food Insecurity:     Worried About Running Out of Food in the Last Year:     Ran Out of Food in the Last Year:    Transportation Needs:     Lack of Transportation (Medical):      Lack of Transportation (Non-Medical):    Physical Activity:     Days of

## 2021-07-23 ENCOUNTER — OFFICE VISIT (OUTPATIENT)
Dept: ORTHOPEDIC SURGERY | Age: 51
End: 2021-07-23

## 2021-07-23 VITALS — TEMPERATURE: 98 F | WEIGHT: 126 LBS | HEIGHT: 63 IN | BODY MASS INDEX: 22.32 KG/M2

## 2021-07-23 DIAGNOSIS — S52.602A TRAUMATIC CLOSED DISPLACED FRACTURE OF DISTAL END OF LEFT RADIUS AND ULNA, INITIAL ENCOUNTER: Primary | ICD-10-CM

## 2021-07-23 DIAGNOSIS — S52.502A TRAUMATIC CLOSED DISPLACED FRACTURE OF DISTAL END OF LEFT RADIUS AND ULNA, INITIAL ENCOUNTER: Primary | ICD-10-CM

## 2021-07-23 PROCEDURE — 99024 POSTOP FOLLOW-UP VISIT: CPT | Performed by: ORTHOPAEDIC SURGERY

## 2021-07-23 NOTE — PROGRESS NOTES
Chief Complaint:   Chief Complaint   Patient presents with    Wrist Pain     Left Wrist ORIF DOS 06/08/2021, has been doing therapy at home       Mitzy Alvarenga is about 6 weeks postop ORIF left distal radius. She is doing pretty well. She has been working on some exercises including putty in her hand and some range of motion exercises. She is found that she is able to put her hand on the bars of a bicycle though this is only been on a stationary bike at this time. She does express interest in progressing. Allergies; medications; past medical, surgical, family, and social history; and problem list have been reviewed today and updated as indicated in this encounter seen below. Exam: The wound in the left wrist volar is well-healed. The finger range of motion is good. She has fair range of motion in the wrist with limitations in all planes as expected. She also has some limitations of pronation and supination with pliable endpoints. Her elbow range of motion is good. Radiographs: Imaging of the left wrist and 3 views shows some maintenance of position alignment and fixation. Fracture lines are not distinct consistent with healing progress. ASSESSMENT:    Sabino Luu was seen today for wrist pain. Diagnoses and all orders for this visit:    Traumatic closed displaced fracture of distal end of left radius and ulna, initial encounter  -     XR WRIST LEFT (MIN 3 VIEWS); Future  -     External Referral To Occupational Therapy        PLAN: We will schedule physical therapy and follow-up in 3 to 4 weeks. She may gradually progress activities and keep safety in mind. No follow-ups on file. Current Outpatient Medications   Medication Sig Dispense Refill    NONFORMULARY Multiple supplements fish oil stopped 1 week ago multiple vitamin and probiotic stopped Friday    Also on devils claw turmeric toke berry trevor       No current facility-administered medications for this visit.        Patient Active Problem List   Diagnosis    Shoulder pain    Shoulder contusion    Shoulder sprain    Arthritis of left hip    Trauma    Fracture of right tibia    Fracture of right fibula    Degenerative arthritis of hip    Traumatic closed displaced fracture of distal end of left radius       Past Medical History:   Diagnosis Date    Arthritis     left hip osteo arthritis    Fractures     right tibia fibula    Nosebleed 2011    history of bad bleed; one time only    PONV (postoperative nausea and vomiting)     sodium pentathol    PVC (premature ventricular contraction)     history of     Rectal fissure     Varicose veins of left lower extremity        Past Surgical History:   Procedure Laterality Date    CYST REMOVAL  2007    necrotized tissue; encapsulated; removed groin    FOREARM SURGERY Left 6/8/2021    LEFT RADIUS  OPEN REDUCTION INTERNAL FIXATION performed by Jackie Jones DO at [address]  10/2017    tibia, fibula right leg    KNEE SURGERY  1983    lateral release    OTHER SURGICAL HISTORY Right 09/26/2017    ORIF right lower tibia, fibula    TOTAL HIP ARTHROPLASTY Left 5/7/2019    LEFT HIP TOTAL ARTHROPLASTY WITH CELL SAVER (DEPUY) performed by Jackie Jones DO at [address] EXTRACTION         Allergies   Allergen Reactions    Vicodin [Hydrocodone-Acetaminophen] Nausea And Vomiting    Naproxen Other (See Comments)     Rectal bleeding, fever    Sulfa Antibiotics Hives and Itching    Codeine Nausea And Vomiting       Social History     Socioeconomic History    Marital status:      Spouse name: None    Number of children: None    Years of education: None    Highest education level: None   Occupational History    None   Tobacco Use    Smoking status: Never Smoker    Smokeless tobacco: Never Used   Vaping Use    Vaping Use: Never used   Substance and Sexual Activity    Alcohol use: Yes     Comment: occas.  beer or wine    Drug use: No    Sexual activity: None   Other Topics Concern    None   Social History Narrative    None     Social Determinants of Health     Financial Resource Strain:     Difficulty of Paying Living Expenses:    Food Insecurity:     Worried About Running Out of Food in the Last Year:     [address] in the Last Year:    Transportation Needs:     Lack of Transportation (Medical):  Lack of Transportation (Non-Medical):    Physical Activity:     Days of Exercise per Week:     Minutes of Exercise per Session:    Stress:     Feeling of Stress :    Social Connections:     Frequency of Communication with Friends and Family:     Frequency of Social Gatherings with Friends and Family:     Attends Yazidism Services:     Active Member of Clubs or Organizations:     Attends Club or Organization Meetings:     Marital Status:    Intimate Partner Violence:     Fear of Current or Ex-Partner:     Emotionally Abused:     Physically Abused:     Sexually Abused:        Review of Systems  As follows except as previously noted in HPI:  Constitutional: Negative for chills, diaphoresis, fatigue, fever and unexpected weight change. Respiratory: Negative for cough, shortness of breath and wheezing. Cardiovascular: Negative for chest pain and palpitations. Neurological: Negative for dizziness, syncope, cephalgia. GI / : negative  Musculoskeletal: see HPI       Objective:   Physical Exam   Constitutional: Oriented to person, place, and time. and appears well-developed and well-nourished. :   Head: Normocephalic and atraumatic. Eyes: EOM are normal.   Neck: Neck supple. Cardiovascular: Normal rate and regular rhythm. Pulmonary/Chest: Effort normal. No stridor. No respiratory distress, no wheezes. Abdominal:  No abnormal distension. Neurological: Alert and oriented to person, place, and time. Skin: Skin is warm and dry. Psychiatric: Normal mood and affect.  Behavior is normal. Thought content normal.    K Pascale Virgen DO    7/23/21  9:54 AM

## 2021-08-13 ENCOUNTER — OFFICE VISIT (OUTPATIENT)
Dept: ORTHOPEDIC SURGERY | Age: 51
End: 2021-08-13

## 2021-08-13 VITALS — HEIGHT: 63 IN | WEIGHT: 125 LBS | BODY MASS INDEX: 22.15 KG/M2

## 2021-08-13 DIAGNOSIS — S52.602A TRAUMATIC CLOSED DISPLACED FRACTURE OF DISTAL END OF LEFT RADIUS AND ULNA, INITIAL ENCOUNTER: Primary | ICD-10-CM

## 2021-08-13 DIAGNOSIS — S52.502A TRAUMATIC CLOSED DISPLACED FRACTURE OF DISTAL END OF LEFT RADIUS AND ULNA, INITIAL ENCOUNTER: Primary | ICD-10-CM

## 2021-08-13 PROCEDURE — 99024 POSTOP FOLLOW-UP VISIT: CPT | Performed by: ORTHOPAEDIC SURGERY

## 2021-08-13 NOTE — PROGRESS NOTES
closed displaced fracture of distal end of left radius       Past Medical History:   Diagnosis Date    Arthritis     left hip osteo arthritis    Fractures     right tibia fibula    Nosebleed 2011    history of bad bleed; one time only    PONV (postoperative nausea and vomiting)     sodium pentathol    PVC (premature ventricular contraction)     history of     Rectal fissure     Varicose veins of left lower extremity        Past Surgical History:   Procedure Laterality Date    CYST REMOVAL  2007    necrotized tissue; encapsulated; removed groin    FOREARM SURGERY Left 6/8/2021    LEFT RADIUS  OPEN REDUCTION INTERNAL FIXATION performed by Landon Araya DO at [place]  10/2017    tibia, fibula right leg    KNEE SURGERY  1983    lateral release    OTHER SURGICAL HISTORY Right 09/26/2017    ORIF right lower tibia, fibula    TOTAL HIP ARTHROPLASTY Left 5/7/2019    LEFT HIP TOTAL ARTHROPLASTY WITH CELL SAVER (DEPUY) performed by Landon Araya DO at [address] EXTRACTION         Allergies   Allergen Reactions    Vicodin [Hydrocodone-Acetaminophen] Nausea And Vomiting    Naproxen Other (See Comments)     Rectal bleeding, fever    Sulfa Antibiotics Hives and Itching    Codeine Nausea And Vomiting       Social History     Socioeconomic History    Marital status:      Spouse name: None    Number of children: None    Years of education: None    Highest education level: None   Occupational History    None   Tobacco Use    Smoking status: Never Smoker    Smokeless tobacco: Never Used   Vaping Use    Vaping Use: Never used   Substance and Sexual Activity    Alcohol use: Yes     Comment: occas.  beer or wine    Drug use: No    Sexual activity: None   Other Topics Concern    None   Social History Narrative    None     Social Determinants of Health     Financial Resource Strain:     Difficulty of Paying Living Expenses:    Food Insecurity:     Worried About

## 2021-08-18 ENCOUNTER — TELEPHONE (OUTPATIENT)
Dept: VASCULAR SURGERY | Age: 51
End: 2021-08-18

## 2021-08-18 NOTE — TELEPHONE ENCOUNTER
Received referral from Dr. Ann Singh for chang Hendrix regardinf CVI-leg, left message for patient to return call.

## 2021-09-03 ENCOUNTER — OFFICE VISIT (OUTPATIENT)
Dept: ORTHOPEDIC SURGERY | Age: 51
End: 2021-09-03

## 2021-09-03 VITALS — BODY MASS INDEX: 22.15 KG/M2 | HEIGHT: 63 IN | TEMPERATURE: 98 F | WEIGHT: 125 LBS

## 2021-09-03 DIAGNOSIS — S52.502A TRAUMATIC CLOSED DISPLACED FRACTURE OF DISTAL END OF LEFT RADIUS AND ULNA, INITIAL ENCOUNTER: Primary | ICD-10-CM

## 2021-09-03 DIAGNOSIS — S52.602A TRAUMATIC CLOSED DISPLACED FRACTURE OF DISTAL END OF LEFT RADIUS AND ULNA, INITIAL ENCOUNTER: Primary | ICD-10-CM

## 2021-09-03 PROCEDURE — 99024 POSTOP FOLLOW-UP VISIT: CPT | Performed by: ORTHOPAEDIC SURGERY

## 2021-09-03 NOTE — PROGRESS NOTES
 Degenerative arthritis of hip    Traumatic closed displaced fracture of distal end of left radius       Past Medical History:   Diagnosis Date    Arthritis     left hip osteo arthritis    Fractures     right tibia fibula    Nosebleed 2011    history of bad bleed; one time only    PONV (postoperative nausea and vomiting)     sodium pentathol    PVC (premature ventricular contraction)     history of     Rectal fissure     Varicose veins of left lower extremity        Past Surgical History:   Procedure Laterality Date    CYST REMOVAL  2007    necrotized tissue; encapsulated; removed groin    FOREARM SURGERY Left 6/8/2021    LEFT RADIUS  OPEN REDUCTION INTERNAL FIXATION performed by Aviva Ragsdale DO at [address]  10/2017    tibia, fibula right leg    KNEE SURGERY  1983    lateral release    OTHER SURGICAL HISTORY Right 09/26/2017    ORIF right lower tibia, fibula    TOTAL HIP ARTHROPLASTY Left 5/7/2019    LEFT HIP TOTAL ARTHROPLASTY WITH CELL SAVER (DEPUY) performed by Aviva Ragsdale DO at [address] EXTRACTION         Allergies   Allergen Reactions    Vicodin [Hydrocodone-Acetaminophen] Nausea And Vomiting    Naproxen Other (See Comments)     Rectal bleeding, fever    Sulfa Antibiotics Hives and Itching    Codeine Nausea And Vomiting       Social History     Socioeconomic History    Marital status:      Spouse name: None    Number of children: None    Years of education: None    Highest education level: None   Occupational History    None   Tobacco Use    Smoking status: Never Smoker    Smokeless tobacco: Never Used   Vaping Use    Vaping Use: Never used   Substance and Sexual Activity    Alcohol use: Yes     Comment: occas.  beer or wine    Drug use: No    Sexual activity: None   Other Topics Concern    None   Social History Narrative    None     Social Determinants of Health     Financial Resource Strain:     Difficulty of Paying Living Expenses:    Food Insecurity:     Worried About Running Out of Food in the Last Year:     [address] in the Last Year:    Transportation Needs:     Lack of Transportation (Medical):  Lack of Transportation (Non-Medical):    Physical Activity:     Days of Exercise per Week:     Minutes of Exercise per Session:    Stress:     Feeling of Stress :    Social Connections:     Frequency of Communication with Friends and Family:     Frequency of Social Gatherings with Friends and Family:     Attends Restorationism Services:     Active Member of Clubs or Organizations:     Attends Club or Organization Meetings:     Marital Status:    Intimate Partner Violence:     Fear of Current or Ex-Partner:     Emotionally Abused:     Physically Abused:     Sexually Abused:        Review of Systems  As follows except as previously noted in HPI:  Constitutional: Negative for chills, diaphoresis, fatigue, fever and unexpected weight change. Respiratory: Negative for cough, shortness of breath and wheezing. Cardiovascular: Negative for chest pain and palpitations. Neurological: Negative for dizziness, syncope, cephalgia. GI / : negative  Musculoskeletal: see HPI       Objective:   Physical Exam   Constitutional: Oriented to person, place, and time. and appears well-developed and well-nourished. :   Head: Normocephalic and atraumatic. Eyes: EOM are normal.   Neck: Neck supple. Cardiovascular: Normal rate and regular rhythm. Pulmonary/Chest: Effort normal. No stridor. No respiratory distress, no wheezes. Abdominal:  No abnormal distension. Neurological: Alert and oriented to person, place, and time. Skin: Skin is warm and dry. Psychiatric: Normal mood and affect.  Behavior is normal. Thought content normal.    FAYE Giraldo DO    9/3/21  10:25 AM

## 2021-11-01 ENCOUNTER — TELEPHONE (OUTPATIENT)
Dept: VASCULAR SURGERY | Age: 51
End: 2021-11-01

## 2021-11-02 ENCOUNTER — OFFICE VISIT (OUTPATIENT)
Dept: VASCULAR SURGERY | Age: 51
End: 2021-11-02
Payer: COMMERCIAL

## 2021-11-02 VITALS — DIASTOLIC BLOOD PRESSURE: 72 MMHG | SYSTOLIC BLOOD PRESSURE: 118 MMHG

## 2021-11-02 DIAGNOSIS — I83.93 ASYMPTOMATIC VARICOSE VEINS OF BILATERAL LOWER EXTREMITIES: Primary | ICD-10-CM

## 2021-11-02 PROCEDURE — G8420 CALC BMI NORM PARAMETERS: HCPCS | Performed by: PHYSICIAN ASSISTANT

## 2021-11-02 PROCEDURE — 99203 OFFICE O/P NEW LOW 30 MIN: CPT | Performed by: PHYSICIAN ASSISTANT

## 2021-11-02 PROCEDURE — G8427 DOCREV CUR MEDS BY ELIG CLIN: HCPCS | Performed by: PHYSICIAN ASSISTANT

## 2021-11-02 PROCEDURE — 3017F COLORECTAL CA SCREEN DOC REV: CPT | Performed by: PHYSICIAN ASSISTANT

## 2021-11-02 PROCEDURE — G8484 FLU IMMUNIZE NO ADMIN: HCPCS | Performed by: PHYSICIAN ASSISTANT

## 2021-11-02 PROCEDURE — 1036F TOBACCO NON-USER: CPT | Performed by: PHYSICIAN ASSISTANT

## 2021-11-02 NOTE — PROGRESS NOTES
Vascular Surgery Outpatient Consultation    Chief Complaint   Patient presents with    Surgical Consult     CVI (L) leg     Reason for Consult:  Varicose Veins    Requesting Physician:  Dr. Teddy Carson:                The patient is a 46 y.o. female who states a 10+ year history of painful varicose veins in bilateral legs. The patient denies a history of DVT. She has used thigh high and pantyhose compression in the past after her L hip replacement surgery in 2019. She states she is very active and likes to mountain bike. She has a cluster of veins behind her knee that will occasionally be bumped by the pedal when walking her bike and she worries it will break open. She has had no bleeding episodes. She does not have issues with edema or leg heaviness, achiness or pain. She will occasionally get a discomfort to the posterior L knee when something rubs against it. 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     Past Medical History:        Diagnosis Date    Arthritis     left hip osteo arthritis    Fractures     right tibia fibula    Nosebleed 2011    history of bad bleed; one time only    PONV (postoperative nausea and vomiting)     sodium pentathol    PVC (premature ventricular contraction)     history of     Rectal fissure     Varicose veins of left lower extremity      Past Surgical History:        Procedure Laterality Date    CYST REMOVAL  2007    necrotized tissue; encapsulated; removed groin    FOREARM SURGERY Left 6/8/2021    LEFT RADIUS  OPEN REDUCTION INTERNAL FIXATION performed by Ivonne Tony DO at [address] [facility]  10/2017    tibia, fibula right leg    KNEE SURGERY  1983    lateral release    OTHER SURGICAL HISTORY Right 09/26/2017    ORIF right lower tibia, fibula    TOTAL HIP ARTHROPLASTY Left 5/7/2019    LEFT HIP TOTAL ARTHROPLASTY WITH CELL SAVER ([address]) performed by Ivonne Tony DO at [address] Current Medications:   Prior to Admission medications    Medication Sig Start Date End Date Taking? Authorizing Provider   NONFORMULARY Malemarlena peak performance   Yes Historical Provider, MD     Allergies:  Vicodin [hydrocodone-acetaminophen], Naproxen, Sulfa antibiotics, and Codeine    Social History     Socioeconomic History    Marital status:      Spouse name: Not on file    Number of children: Not on file    Years of education: Not on file    Highest education level: Not on file   Occupational History    Not on file   Tobacco Use    Smoking status: Never Smoker    Smokeless tobacco: Never Used   Vaping Use    Vaping Use: Never used   Substance and Sexual Activity    Alcohol use: Yes     Comment: occas. beer or wine    Drug use: No    Sexual activity: Not on file   Other Topics Concern    Not on file   Social History Narrative    Not on file     Social Determinants of Health     Financial Resource Strain:     Difficulty of Paying Living Expenses:    Food Insecurity:     Worried About Running Out of Food in the Last Year:     [address] in the Last Year:    Transportation Needs:     Lack of Transportation (Medical):      Lack of Transportation (Non-Medical):    Physical Activity:     Days of Exercise per Week:     Minutes of Exercise per Session:    Stress:     Feeling of Stress :    Social Connections:     Frequency of Communication with Friends and Family:     Frequency of Social Gatherings with Friends and Family:     Attends Anabaptist Services:     Active Member of Clubs or Organizations:     Attends Club or Organization Meetings:     Marital Status:    Intimate Partner Violence:     Fear of Current or Ex-Partner:     Emotionally Abused:     Physically Abused:     Sexually Abused:         Family History   Problem Relation Age of Onset    Cancer Mother     Cancer Father     Cancer Maternal Grandmother     No Known Problems Sister        REVIEW OF SYSTEMS (Recent symptoms): Negative if blank [], Positive if [x]       Constitutional    [] Fevers / chills  [] General weakness   [] Poor appetite    [] Weight gain or loss  Eyes    [] Blurred vision  [] Wear glasses / contacts   [] Visual disturbances   [] Blindness  Ears, Nose, Throat    [] Hearing loss  [] Ringing in ears   [] Nose bleeding    [] Voice hoarseness  [] Difficulty swallowing      Lungs    [] Cough  [] Chest pain   [] Wheezing    [] Difficult breathing  Heart    [] Chest pain during activity  [] Dizziness   [] Irregular heart beat    [] Fainting episode    [] Leg swelling   Stomach, Intestines    [] Intestinal bleeding  [] Heart burn   [] Abdominal pain    [] Stomach ulcers   [] Change in bowel habits      Kidney, Prostate    [] Frequent need to urinate  [] Incontinence   [] Blood in urine    [] Erectile dysfunction (men)      [] All negative   Hematologic, Lymphatic,   Skin, Musculoskeletal    [] Easy bruising  [] Swollen lymph nodes   [] Skin lesions, ulcers   [] Skin rash  [] Neck or back pain   [x] Leg / foot pain     Neurologic    [] Speech problems  [] Memory loss   [] Headaches     [] Walking problems    [] Hand / arm / leg weakness   [] Numbness in arms or legs       PHYSICAL EXAM:      CONSTITUTIONAL:  awake, alert, cooperative, no apparent distress, and appears stated age  EYES:  extra-ocular muscles intact and vision intact  ENT:  normocepalic, without obvious abnormality, atraumatic  NECK:  supple, symmetrical, trachea midline, no jugular venous distension, no carotid bruits,  and MASSES:  no masses  LUNGS:  no increased work of breathing, good air exchange and clear to auscultation  CARDIOVASCULAR:  regular rate and rhythm and no murmur noted  ABDOMEN:  soft, non-distended, non-tender, Aorta not palpated  SKIN:  no lesions, small varicose saphenous vein branches located L posterior knee.  + telangiectasias bilaterally  EXTREMITIES: no edema BLE          Right      Left   Brachial     Radial 2 2 Femoral     Popliteal     Dorsalis Pedis 1 1   Posterior Tibial 1 1   (3=normal, 2=diminished, 1=barely palpable, 4=widened)      IMPRESSION/RECOMMENDATIONS:        Problem List Items Addressed This Visit        Vascular Problems    Asymptomatic varicose veins of bilateral lower extremities - Primary        I reviewed with the patient that the best treatment for varicose veins is to wear compression stockings. She will continue to do so. At this time she was not having significant pain to the legs, but wanted to make sure they did not require intervention and that the varicosities were not dangerous. I reassured her that these are not dangerous and do not require intervention. If she were having debilitating symptoms despite conservative management, then we could consider further testing or intervention. I discussed if she were to experience a bleeding episode from a varicosity that she should hold pressure with two fingers over the area for 15 mins until the bleeding stops. At this time since she is not experiencing many symptoms, she would like to hold off on further testing or intervention. Since her varicosities are small, she may even consider sclerotherapy. I asked her to call with any issues and we would be happy to see her back. Pt seen and plan reviewed with Dr. Jorge Luis Raymundo.      Jeff Alonso PA-C

## 2024-01-24 ENCOUNTER — OFFICE VISIT (OUTPATIENT)
Dept: ORTHOPEDIC SURGERY | Age: 54
End: 2024-01-24
Payer: COMMERCIAL

## 2024-01-24 VITALS — BODY MASS INDEX: 22.15 KG/M2 | TEMPERATURE: 98 F | WEIGHT: 125 LBS | HEIGHT: 63 IN

## 2024-01-24 DIAGNOSIS — M25.531 WRIST PAIN, ACUTE, RIGHT: Primary | ICD-10-CM

## 2024-01-24 DIAGNOSIS — M77.8 TENDINITIS OF RIGHT WRIST: ICD-10-CM

## 2024-01-24 PROCEDURE — G8427 DOCREV CUR MEDS BY ELIG CLIN: HCPCS | Performed by: ORTHOPAEDIC SURGERY

## 2024-01-24 PROCEDURE — G8420 CALC BMI NORM PARAMETERS: HCPCS | Performed by: ORTHOPAEDIC SURGERY

## 2024-01-24 PROCEDURE — 1036F TOBACCO NON-USER: CPT | Performed by: ORTHOPAEDIC SURGERY

## 2024-01-24 PROCEDURE — G8484 FLU IMMUNIZE NO ADMIN: HCPCS | Performed by: ORTHOPAEDIC SURGERY

## 2024-01-24 PROCEDURE — 3017F COLORECTAL CA SCREEN DOC REV: CPT | Performed by: ORTHOPAEDIC SURGERY

## 2024-01-24 PROCEDURE — 99213 OFFICE O/P EST LOW 20 MIN: CPT | Performed by: ORTHOPAEDIC SURGERY

## 2024-01-24 NOTE — PROGRESS NOTES
More Moreno is a 53 y.o. female, who presents   Chief Complaint   Patient presents with    Wrist Injury     Right wrist pain since December. Having pain with Rom in the hand. Mostly radial side wrist pain. Does remember helping her neighbor and lifting a grill and had some wrist pain but not sure if that is causing this pain. Not reporting of numbness in the hand at this time.        HPI:: More has been having some right wrist pain for a couple of months.  She has no history of specific injury to this.  She is right-hand dominant works as a teacher.  She does have to carry of a lot of objects around associated with her work.  She is also active in a lot of other ways.  She indicates the discomfort along the ulnar side of her right wrist slightly volar to the ulnar styloid.  She has good range of motion overall but has discomfort more with a supination type motion as when she is holding or carrying something and sometimes with a quicker dorsiflexion of the wrist and fingers.  There is been no specific treatment for this.    Allergies; medications; past medical, surgical, family, and social history; and problem list have been reviewed today and updated as indicated in this encounter - see below following Ortho specifics.    Musculoskeletal: Skin condition gross neurovascular functions good in right upper extremity.  Shoulder elbow wrist and hand motion are all good.  Stability is good throughout her joints with discomfort only in the wrist area.  This is along the ulnar side between the flexor and extensor carpi ulnaris tendons.  There is no crepitus with range of motion and no instability.  She has only mild tenderness to palpation in this area.  The distal radial ulnar joint is stable.  There is no hyperemia or erythema.    Radiologic Studies: None    ASSESSMENT:  More was seen today for wrist injury.    Diagnoses and all orders for this visit:    Wrist pain, acute, right    Tendinitis of right wrist

## (undated) DEVICE — 3M™ IOBAN™ 2 ANTIMICROBIAL INCISE DRAPE 6651EZ: Brand: IOBAN™ 2

## (undated) DEVICE — TUBING, SUCTION, 1/4" X 10', STRAIGHT: Brand: MEDLINE

## (undated) DEVICE — BLADE ES L6IN ELASTOMERIC COAT EXT DURABLE BEND UPTO 90DEG

## (undated) DEVICE — MEDI-VAC YANKAUER SUCTION HANDLE: Brand: CARDINAL HEALTH

## (undated) DEVICE — SHEET,DRAPE,40X58,STERILE: Brand: MEDLINE

## (undated) DEVICE — Device

## (undated) DEVICE — NDL CNTR 40CT FM MAG: Brand: MEDLINE INDUSTRIES, INC.

## (undated) DEVICE — INTENDED FOR TISSUE SEPARATION, AND OTHER PROCEDURES THAT REQUIRE A SHARP SURGICAL BLADE TO PUNCTURE OR CUT.: Brand: BARD-PARKER ® STAINLESS STEEL BLADES

## (undated) DEVICE — PACK EXT II SIRUS

## (undated) DEVICE — SET MAJOR INSTR ORTHO

## (undated) DEVICE — SYRINGE IRRIG 60ML SFT PLIABLE BLB EZ TO GRP 1 HND USE W/

## (undated) DEVICE — COVER,TABLE,60X90,STERILE: Brand: MEDLINE

## (undated) DEVICE — 2108 SERIES SAGITTAL BLADE (24.8 X 0.88 X 90.5MM)

## (undated) DEVICE — GOWN,SIRUS,POLYRNF,BRTHSLV,XLN/XL,20/CS: Brand: MEDLINE

## (undated) DEVICE — SCREW BNE L22MM DIA2.4MM CORT S STL ST T8 STARDRV RECESS
Type: IMPLANTABLE DEVICE | Site: ARM | Status: NON-FUNCTIONAL
Removed: 2021-06-08

## (undated) DEVICE — Z DISCONTINUED USE 2275686 GLOVE SURG SZ 8 L12IN FNGR THK13MIL WHT ISOLEX POLYISOPRENE

## (undated) DEVICE — BANDAGE,ELASTIC,ESMARK,STERILE,4"X9',LF: Brand: MEDLINE

## (undated) DEVICE — GOWN,SIRUS,FABRNF,XL,20/CS: Brand: MEDLINE

## (undated) DEVICE — K WIRE FIX L150MM DIA1.25MM S STL TRCR PNT
Type: IMPLANTABLE DEVICE | Site: ARM | Status: NON-FUNCTIONAL
Removed: 2021-06-08

## (undated) DEVICE — PATIENT RETURN ELECTRODE, SINGLE-USE, CONTACT QUALITY MONITORING, ADULT, WITH 9FT CORD, FOR PATIENTS WEIGING OVER 33LBS. (15KG): Brand: MEGADYNE

## (undated) DEVICE — SOLUTION IV IRRIG 250ML ST LF 0.9% SODIUM 2F7122

## (undated) DEVICE — PADDING,UNDERCAST,COTTON, 4"X4YD STERILE: Brand: MEDLINE

## (undated) DEVICE — GRADUATE

## (undated) DEVICE — DRESSING GZ W1XL8IN COT XRFRM N ADH OVERWRAP CURAD

## (undated) DEVICE — TOWEL,OR,DSP,ST,BLUE,STD,6/PK,12PK/CS: Brand: MEDLINE

## (undated) DEVICE — HAND SET

## (undated) DEVICE — BIT DRL L110MM DIA1.8MM QUIK CPL CALIB W/O STP REUSE

## (undated) DEVICE — Z DISCONTINUED NO SUB IDED GLOVE SURG BEAD CUF 8 STD PF WHT STRL TRIUMPH LT LTX

## (undated) DEVICE — COVER,LIGHT HANDLE,FLX,1/PK: Brand: MEDLINE INDUSTRIES, INC.

## (undated) DEVICE — 3M™ STERI-DRAPE™ U-DRAPE 1015: Brand: STERI-DRAPE™

## (undated) DEVICE — RESERVOIR BLD COLLCTN C3000ML 30UM FLTR

## (undated) DEVICE — DRAPE 64X41IN RADIOLOGY C ARM EQUIP STER

## (undated) DEVICE — K WIRE FIX L150MM DIA1.6MM S STL TRCR PNT
Type: IMPLANTABLE DEVICE | Site: ARM | Status: NON-FUNCTIONAL
Removed: 2021-06-08

## (undated) DEVICE — PACK,ORTHOPEDIC III,AURORA: Brand: MEDLINE

## (undated) DEVICE — KIT SUCT DBL LUMN QLOK STD RESVR BRAT CELL SAVR

## (undated) DEVICE — GAUZE,SPONGE,4"X4",16PLY,STRL,LF,10/TRAY: Brand: MEDLINE

## (undated) DEVICE — SOLUTION IV IRRIG WATER 1000ML POUR BRL 2F7114

## (undated) DEVICE — READY WET SKIN SCRUB TRAY-LF: Brand: MEDLINE INDUSTRIES, INC.

## (undated) DEVICE — SOLUTION IV IRRIG POUR BRL 0.9% SODIUM CHL 2F7124

## (undated) DEVICE — 3M™ IOBAN™ 2 ANTIMICROBIAL INCISE DRAPE 6640EZ: Brand: IOBAN™ 2

## (undated) DEVICE — DOUBLE BASIN SET: Brand: MEDLINE INDUSTRIES, INC.

## (undated) DEVICE — SYRINGE MED 50ML LUERLOCK TIP

## (undated) DEVICE — SPONGE,LAP,12"X12",XR,ST,5/PK,40PK/CS: Brand: MEDLINE

## (undated) DEVICE — WOUND RETRACTOR AND PROTECTOR: Brand: ALEXIS WOUND PROTECTOR-RETRACTOR

## (undated) DEVICE — CANNULA IV 18GA L15IN BLNT FILL LUERLOCK HUB MJCT

## (undated) DEVICE — GAUZE,SPONGE,4"X4",16PLY,XRAY,STRL,LF: Brand: MEDLINE

## (undated) DEVICE — ELECTRODE PT RET AD L9FT HI MOIST COND ADH HYDRGEL CORDED

## (undated) DEVICE — BIT DRL L100MM DIA2MM ST QUIK CPL NONRADIOPAQUE W/O STP

## (undated) DEVICE — SHEET DRAPE FULL 70X100

## (undated) DEVICE — BANDAGE COMPR W4INXL5YD WHT BGE POLY COT M E WRP WV HK AND

## (undated) DEVICE — PENCIL ES L3M BTTN SWCH HOLSTER W/ BLDE ELECTRD EDGE

## (undated) DEVICE — TOTAL TRAY, 16FR 10ML SIL FOLEY, URN: Brand: MEDLINE

## (undated) DEVICE — SHEET SUPPORT

## (undated) DEVICE — MARKER,SKIN,WI/RULER AND LABELS: Brand: MEDLINE

## (undated) DEVICE — PICO SINGLE USE NEGATIVE PRESSURE WOUND THERAPY SYSTEM 10CM X 30CM 4IN. X 12IN.: Brand: PICO

## (undated) DEVICE — GUN GLUE STRYKER

## (undated) DEVICE — BANDAGE COMPR W6INXL5YD SELF ADH COHESIVE CO FLX